# Patient Record
Sex: FEMALE | Race: WHITE | Employment: FULL TIME | URBAN - METROPOLITAN AREA
[De-identification: names, ages, dates, MRNs, and addresses within clinical notes are randomized per-mention and may not be internally consistent; named-entity substitution may affect disease eponyms.]

---
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
## 2020-07-17 ENCOUNTER — TELEPHONE (OUTPATIENT)
Dept: FAMILY MEDICINE CLINIC | Age: 20
End: 2020-07-17

## 2020-07-17 ENCOUNTER — NURSE TRIAGE (OUTPATIENT)
Dept: OTHER | Facility: CLINIC | Age: 20
End: 2020-07-17

## 2020-07-17 ENCOUNTER — OFFICE VISIT (OUTPATIENT)
Dept: FAMILY MEDICINE CLINIC | Age: 20
End: 2020-07-17
Payer: COMMERCIAL

## 2020-07-17 VITALS
OXYGEN SATURATION: 97 % | HEIGHT: 66 IN | DIASTOLIC BLOOD PRESSURE: 68 MMHG | TEMPERATURE: 99.3 F | WEIGHT: 170.4 LBS | SYSTOLIC BLOOD PRESSURE: 134 MMHG | HEART RATE: 96 BPM | BODY MASS INDEX: 27.38 KG/M2

## 2020-07-17 PROBLEM — M25.562 ARTHRALGIA OF BOTH KNEES: Status: ACTIVE | Noted: 2020-07-17

## 2020-07-17 PROBLEM — M25.571 ARTHRALGIA OF BOTH ANKLES: Status: ACTIVE | Noted: 2020-07-17

## 2020-07-17 PROBLEM — M25.572 ARTHRALGIA OF BOTH ANKLES: Status: ACTIVE | Noted: 2020-07-17

## 2020-07-17 PROBLEM — Z76.89 ENCOUNTER TO ESTABLISH CARE: Status: ACTIVE | Noted: 2020-07-17

## 2020-07-17 PROBLEM — Z00.01 ENCOUNTER FOR WELL ADULT EXAM WITH ABNORMAL FINDINGS: Status: ACTIVE | Noted: 2020-07-17

## 2020-07-17 PROBLEM — M25.561 ARTHRALGIA OF BOTH KNEES: Status: ACTIVE | Noted: 2020-07-17

## 2020-07-17 LAB
A/G RATIO: 1.7 (ref 1.1–2.2)
ALBUMIN SERPL-MCNC: 4.6 G/DL (ref 3.4–5)
ALP BLD-CCNC: 66 U/L (ref 40–129)
ALT SERPL-CCNC: 12 U/L (ref 10–40)
ANION GAP SERPL CALCULATED.3IONS-SCNC: 15 MMOL/L (ref 3–16)
AST SERPL-CCNC: 18 U/L (ref 15–37)
BASOPHILS ABSOLUTE: 0.1 K/UL (ref 0–0.2)
BASOPHILS RELATIVE PERCENT: 0.5 %
BILIRUB SERPL-MCNC: <0.2 MG/DL (ref 0–1)
BUN BLDV-MCNC: 17 MG/DL (ref 7–20)
C-REACTIVE PROTEIN: <0.3 MG/L (ref 0–5.1)
CALCIUM SERPL-MCNC: 9.7 MG/DL (ref 8.3–10.6)
CHLORIDE BLD-SCNC: 102 MMOL/L (ref 99–110)
CHOLESTEROL, FASTING: 149 MG/DL (ref 0–199)
CO2: 21 MMOL/L (ref 21–32)
CREAT SERPL-MCNC: 0.7 MG/DL (ref 0.6–1.1)
EOSINOPHILS ABSOLUTE: 0.1 K/UL (ref 0–0.6)
EOSINOPHILS RELATIVE PERCENT: 0.9 %
GFR AFRICAN AMERICAN: >60
GFR NON-AFRICAN AMERICAN: >60
GLOBULIN: 2.7 G/DL
GLUCOSE FASTING: 96 MG/DL (ref 70–99)
HCT VFR BLD CALC: 41.7 % (ref 36–48)
HDLC SERPL-MCNC: 58 MG/DL (ref 40–60)
HEMOGLOBIN: 13.5 G/DL (ref 12–16)
LDL CHOLESTEROL CALCULATED: 80 MG/DL
LYMPHOCYTES ABSOLUTE: 2.8 K/UL (ref 1–5.1)
LYMPHOCYTES RELATIVE PERCENT: 21.7 %
MCH RBC QN AUTO: 27.7 PG (ref 26–34)
MCHC RBC AUTO-ENTMCNC: 32.5 G/DL (ref 31–36)
MCV RBC AUTO: 85.4 FL (ref 80–100)
MONOCYTES ABSOLUTE: 0.8 K/UL (ref 0–1.3)
MONOCYTES RELATIVE PERCENT: 6.6 %
NEUTROPHILS ABSOLUTE: 9 K/UL (ref 1.7–7.7)
NEUTROPHILS RELATIVE PERCENT: 70.3 %
PDW BLD-RTO: 13.6 % (ref 12.4–15.4)
PLATELET # BLD: 204 K/UL (ref 135–450)
PMV BLD AUTO: 10.4 FL (ref 5–10.5)
POTASSIUM SERPL-SCNC: 4 MMOL/L (ref 3.5–5.1)
RBC # BLD: 4.89 M/UL (ref 4–5.2)
SEDIMENTATION RATE, ERYTHROCYTE: 6 MM/HR (ref 0–20)
SODIUM BLD-SCNC: 138 MMOL/L (ref 136–145)
T4 FREE: 1.7 NG/DL (ref 0.9–1.8)
TOTAL PROTEIN: 7.3 G/DL (ref 6.4–8.2)
TRIGLYCERIDE, FASTING: 55 MG/DL (ref 0–150)
TSH SERPL DL<=0.05 MIU/L-ACNC: 4.07 UIU/ML (ref 0.27–4.2)
VLDLC SERPL CALC-MCNC: 11 MG/DL
WBC # BLD: 12.8 K/UL (ref 4–11)

## 2020-07-17 PROCEDURE — 36415 COLL VENOUS BLD VENIPUNCTURE: CPT | Performed by: NURSE PRACTITIONER

## 2020-07-17 PROCEDURE — 99385 PREV VISIT NEW AGE 18-39: CPT | Performed by: NURSE PRACTITIONER

## 2020-07-17 RX ORDER — METHYLPREDNISOLONE 4 MG/1
TABLET ORAL
Qty: 1 KIT | Refills: 0 | Status: SHIPPED | OUTPATIENT
Start: 2020-07-17 | End: 2020-07-23

## 2020-07-17 RX ORDER — NAPROXEN 375 MG/1
375 TABLET ORAL 2 TIMES DAILY WITH MEALS
Qty: 20 TABLET | Refills: 1 | Status: SHIPPED | OUTPATIENT
Start: 2020-07-17 | End: 2020-11-19

## 2020-07-17 SDOH — HEALTH STABILITY: MENTAL HEALTH
STRESS IS WHEN SOMEONE FEELS TENSE, NERVOUS, ANXIOUS, OR CAN'T SLEEP AT NIGHT BECAUSE THEIR MIND IS TROUBLED. HOW STRESSED ARE YOU?: VERY MUCH

## 2020-07-17 SDOH — HEALTH STABILITY: MENTAL HEALTH: HOW OFTEN DO YOU HAVE A DRINK CONTAINING ALCOHOL?: NEVER

## 2020-07-17 SDOH — HEALTH STABILITY: PHYSICAL HEALTH: ON AVERAGE, HOW MANY DAYS PER WEEK DO YOU ENGAGE IN MODERATE TO STRENUOUS EXERCISE (LIKE A BRISK WALK)?: 2 DAYS

## 2020-07-17 ASSESSMENT — ENCOUNTER SYMPTOMS
CHEST TIGHTNESS: 0
VOMITING: 0
EYE REDNESS: 0
WHEEZING: 0
SINUS PAIN: 0
SORE THROAT: 0
COUGH: 0
SHORTNESS OF BREATH: 0
EYE PAIN: 0
FACIAL SWELLING: 0
ABDOMINAL PAIN: 0
GASTROINTESTINAL NEGATIVE: 1

## 2020-07-17 ASSESSMENT — PATIENT HEALTH QUESTIONNAIRE - PHQ9
2. FEELING DOWN, DEPRESSED OR HOPELESS: 0
SUM OF ALL RESPONSES TO PHQ QUESTIONS 1-9: 0
1. LITTLE INTEREST OR PLEASURE IN DOING THINGS: 0
SUM OF ALL RESPONSES TO PHQ9 QUESTIONS 1 & 2: 0
SUM OF ALL RESPONSES TO PHQ QUESTIONS 1-9: 0

## 2020-07-17 NOTE — TELEPHONE ENCOUNTER
----- Message from Anapippa Dawn sent at 7/17/2020 12:25 PM EDT -----  Subject: Appointment Request    QUESTIONS  Reason for appointment request? Other - Patient was a patient of Dr. Marvin Faulkner   called in for swelling and redness on leg   nurse triage would like for her to be seen today. Please advise  ---------------------------------------------------------------------------  --------------  CALL BACK INFO  What is the best way for the office to contact you? OK to leave message on   voicemail  Preferred Call Back Phone Number? [phone]  ---------------------------------------------------------------------------  --------------  SCRIPT ANSWERS  Patient needs to be seen today? Yes  Nurse Name? Raghav Yancey  (Did RN indicate the need for Red Scheduling?)?  Yes

## 2020-07-17 NOTE — PATIENT INSTRUCTIONS
Patient Education        Joint Pain: Care Instructions  Your Care Instructions     Many people have small aches and pains from overuse or injury to muscles and joints. Joint injuries often happen during sports or recreation, work tasks, or projects around the home. An overuse injury can happen when you put too much stress on a joint or when you do an activity that stresses the joint over and over, such as using the computer or rowing a boat. You can take action at home to help your muscles and joints get better. You should feel better in 1 to 2 weeks, but it can take 3 months or more to heal completely. Follow-up care is a key part of your treatment and safety. Be sure to make and go to all appointments, and call your doctor if you are having problems. It's also a good idea to know your test results and keep a list of the medicines you take. How can you care for yourself at home? · Do not put weight on the injured joint for at least a day or two. · For the first day or two after an injury, do not take hot showers or baths, and do not use hot packs. The heat could make swelling worse. · Put ice or a cold pack on the sore joint for 10 to 20 minutes at a time. Try to do this every 1 to 2 hours for the next 3 days (when you are awake) or until the swelling goes down. Put a thin cloth between the ice and your skin. · Wrap the injury in an elastic bandage. Do not wrap it too tightly because this can cause more swelling. · Prop up the sore joint on a pillow when you ice it or anytime you sit or lie down during the next 3 days. Try to keep it above the level of your heart. This will help reduce swelling. · Take an over-the-counter pain medicine, such as acetaminophen (Tylenol), ibuprofen (Advil, Motrin), or naproxen (Aleve). Read and follow all instructions on the label. · After 1 or 2 days of rest, begin moving the joint gently.  While the joint is still healing, you can begin to exercise using activities that do not strain or hurt the painful joint. When should you call for help? Call your doctor now or seek immediate medical care if:  · You have signs of infection, such as:  ? Increased pain, swelling, warmth, and redness. ? Red streaks leading from the joint. ? A fever. Watch closely for changes in your health, and be sure to contact your doctor if:  · Your movement or symptoms are not getting better after 1 to 2 weeks of home treatment. Where can you learn more? Go to https://TrunqShow.[employer]. org and sign in to your [employer] account. Enter P205 in the [employer] box to learn more about \"Joint Pain: Care Instructions. \"     If you do not have an account, please click on the \"Sign Up Now\" link. Current as of: March 2, 2020               Content Version: 12.5  © 5623-3941 [employer]. Care instructions adapted under license by [facility] ([facility]). If you have questions about a medical condition or this instruction, always ask your healthcare professional. [place] [address] any warranty or liability for your use of this information. Patient Education        Oral Corticosteroids: Care Instructions  Your Care Instructions     Oral corticosteroids are commonly used medicines. They help calm down the body's response to inflammation. Oral means that they are taken by mouth. This is most often in the form of a pill. They are used for treating many conditions. You may take them for asthma, COPD, back pain, or allergic reactions. They are also used for other conditions such as autoimmune diseases and certain types of cancer. You may have side effects from taking this medicine. These include nausea, headache, dizziness, and anxiety. Pregnant women should not take this medicine unless their doctor tells them to. Follow your doctor's instructions on how to take this medicine.  If you are taking it for 2 weeks or more, your doctor may give you special instructions to slowly reduce (taper) the amount you take. Slowly cutting down on the medicine over time helps your body adjust to the change. Follow-up care is a key part of your treatment and safety. Be sure to make and go to all appointments, and call your doctor if you are having problems. It's also a good idea to know your test results and keep a list of the medicines you take. How can you care for yourself at home? · Be safe with medicines. Take your medicines exactly as prescribed. Call your doctor if you think you are having a problem with your medicine. You will get more details on the specific medicines your doctor prescribes. · Take your medicine after a meal. It may cause nausea if you take it on an empty stomach. · Avoid taking nonsteroidal anti-inflammatory drugs (NSAIDs) while you are taking oral corticosteroids. Taking both of these medicines might cause an upset stomach. NSAIDs include ibuprofen (Advil, Motrin) and naproxen (Aleve). · If you have a history of stomach ulcers, you may want to avoid taking this medicine and an NSAID at the same time. This can cause stomach upset or bleeding. · Follow your doctor's instructions for how to stop taking this medicine. You may need to taper it. This means the medicine should be slowly reduced. Do not stop taking the medicine all at once. When should you call for help? [id number] if:  · You vomit blood or what looks like coffee grounds. Call your doctor now or seek immediate medical care if:  · Your symptoms are getting worse. · You are dizzy or lightheaded, or you feel like you may faint. · You have new or worse nausea or vomiting. · You have stomach pain that is getting worse. · Your stools are black. Watch closely for changes in your health, and be sure to contact your doctor if:  · You do not get better as expected. Where can you learn more? Go to https://chpechongeb.[employer]. org and sign in to your [employer] account.  Enter I485 in the [address] Information box to learn more about \"Oral Corticosteroids: Care Instructions. \"     If you do not have an account, please click on the \"Sign Up Now\" link. Current as of: February 24, 2020               Content Version: 12.5  © 5684-7103 Healthwise, Incorporated. Care instructions adapted under license by [facility] ([facility]). If you have questions about a medical condition or this instruction, always ask your healthcare professional. [address] any warranty or liability for your use of this information.

## 2020-07-17 NOTE — TELEPHONE ENCOUNTER
If this is the correct person. He has never been seen in the office.   It appears he contacted the office in August 2017 to see Alessandro Marie but was never seen  Lia Chopra may have a new patient appointment open today but you can check with her

## 2020-07-17 NOTE — PROGRESS NOTES
2020    Rosa Maria Power (:  2000) is a 21 y.o. female, here for evaluation of the following medical concerns: physical and to discuss joint pain. Chief Complaint   Patient presents with    New Patient    Swelling     knees redness on B. leg    Rash     r arm, around  jaw,     Other     works at [employer]     Past medical, surgical, family and social history, as well as current medications and allergies, were reviewed and updated with the patient. Joint Symptoms:  Patient complains of a four week history of pain, swelling, stiffness, morning stiffness lasting 60 minutes/day, decreased mobility, erythema and warmth in bilateral ankle,  Bilateral hips, bilateral knee and bilateral, lower leg. Pain is persistent, aching in nature, and is moderate in intensity. Radiation: none. Associated symptoms:  fatigue and rash- to bilateral lower cheeks and neck. She denies weakness, paresthesia, fevers, night sweats, unintentional weight loss, fatigue, headache, mucocutaneous ulcers, Raynaud's phenomenon, abdominal pain, dysphagia, change in bowel or bladder habits, nausea, vomiting, visual change, chest pain, shortness of breath, depressed mood, anxiety and insomnia. Symptoms are exacerbated by flexion, extension, standing, lifting and changing positions. Precipitating factors: no known injury. Symptoms are worse first thing in the morning. Prior history of similar symptoms: none. Previous treatment: none. Symptoms show no change over time. Patient states her twin sister has been diagnosed with Rheumatoid Arthritis and she is concerned this is what is happening to her. Rash   This is a new problem. The current episode started 1 to 4 weeks ago. The problem has been gradually improving since onset. The affected locations include the face and right wrist. The rash is characterized by redness. She was exposed to nothing.  Pertinent negatives include no cough, eye pain, facial edema, fatigue, fever, shortness of breath, sore throat or vomiting. Past treatments include nothing. The treatment provided no relief. Patient denies any recent illness, fever, infections    Review of Systems   Constitutional: Negative for appetite change, chills, diaphoresis, fatigue and fever. HENT: Negative for ear pain, facial swelling, nosebleeds, sinus pain and sore throat. Eyes: Negative for pain and redness. Respiratory: Negative for cough, chest tightness, shortness of breath and wheezing. Cardiovascular: Negative for chest pain, palpitations and leg swelling. Gastrointestinal: Negative. Negative for abdominal pain and vomiting. Genitourinary: Negative. Musculoskeletal: Positive for arthralgias and joint swelling. Skin: Positive for rash. Neurological: Negative for dizziness, weakness, light-headedness and numbness. Psychiatric/Behavioral: Negative. Prior to Visit Medications    Medication Sig Taking? Authorizing Provider   methylPREDNISolone (MEDROL DOSEPACK) 4 MG tablet Take by mouth. Yes Edwina Rodríguez APRN - CNP   naproxen (NAPROSYN) 375 MG tablet Take 1 tablet by mouth 2 times daily (with meals) for 10 days Yes MONTANA Barroso CNP        No Known Allergies    History reviewed. No pertinent past medical history. History reviewed. No pertinent surgical history. Social History     Tobacco Use    Smoking status: Never Smoker    Smokeless tobacco: Never Used   Substance Use Topics    Alcohol use: Never     Frequency: Never    Drug use: Never        Family History   Problem Relation Age of Onset    Arthritis Sister        Vitals:    07/17/20 1427   BP: 134/68   Pulse: 96   Temp: 99.3 °F (37.4 °C)   SpO2: 97%   Weight: 170 lb 6.4 oz (77.3 kg)   Height: 5' 5.5\" (1.664 m)     Estimated body mass index is 27.92 kg/m² as calculated from the following:    Height as of this encounter: 5' 5.5\" (1.664 m). Weight as of this encounter: 170 lb 6.4 oz (77.3 kg).     Physical Exam  Vitals signs and nursing note reviewed. Constitutional:       General: She is awake. She is not in acute distress. Appearance: Normal appearance. She is well-developed, well-groomed and normal weight. She is not ill-appearing, toxic-appearing or diaphoretic. HENT:      Head: Normocephalic. Jaw: There is normal jaw occlusion. Right Ear: Tympanic membrane, ear canal and external ear normal.      Left Ear: Tympanic membrane, ear canal and external ear normal.      Nose: Nose normal. No congestion or rhinorrhea. Mouth/Throat:      Mouth: Mucous membranes are moist.      Pharynx: Oropharynx is clear. Eyes:      Conjunctiva/sclera: Conjunctivae normal.      Pupils: Pupils are equal, round, and reactive to light. Neck:      Musculoskeletal: Full passive range of motion without pain. Thyroid: No thyroid mass, thyromegaly or thyroid tenderness. Cardiovascular:      Rate and Rhythm: Normal rate and regular rhythm. Pulses: Normal pulses. Heart sounds: Normal heart sounds, S1 normal and S2 normal. No murmur. Pulmonary:      Effort: Pulmonary effort is normal.      Breath sounds: Normal breath sounds and air entry. Abdominal:      General: Abdomen is flat. Bowel sounds are normal.      Palpations: Abdomen is soft. Musculoskeletal:         General: No deformity or signs of injury. Right knee: She exhibits swelling. Left knee: She exhibits swelling and erythema. Right ankle: She exhibits swelling. Left ankle: She exhibits swelling. Right lower leg: No edema. Left lower leg: No edema. Skin:     General: Skin is warm and dry. Capillary Refill: Capillary refill takes less than 2 seconds. Findings: Rash present. Comments: Rash is noted on bilateral lower cheeks and neck and right wrist.  Does not itch. Neurological:      General: No focal deficit present. Mental Status: She is alert and oriented to person, place, and time.

## 2020-07-17 NOTE — TELEPHONE ENCOUNTER
Reason for Disposition   [1] Very swollen joint AND [2] no fever    Answer Assessment - Initial Assessment Questions  1. LOCATION: \"Where is the swelling located? \"  (e.g., left, right, both knees)      Bilateral knee swelling  2. SIZE and DESCRIPTION: \"What does the swelling look like? \"  (e.g., entire knee, localized)      Right above knee cap into knee cap, and down into ankles. Looks red and only swollen in the knee area  3. ONSET: \"When did the swelling start? \" \"Does it come and go, or is it there all the time? \"      7/3/2020  4. PAIN: \"Is there any pain? \" If so, ask: \"How bad is it? \" (Scale 1-10; or mild, moderate, severe)      8-9/10 on pain scale  5. SETTING: \"Has there been any recent work, exercise or other activity that involved that part of the body? \"       no  6. AGGRAVATING FACTORS: \"What makes the knee swelling worse? \" (e.g., walking, climbing stairs, running)      Activity makes it worse  7. ASSOCIATED SYMPTOMS: \"Is there any pain or redness? \"      Pain and redness yes  8. OTHER SYMPTOMS: \"Do you have any other symptoms? \" (e.g., chest pain, difficulty breathing, fever, calf pain)      no  9. PREGNANCY: \"Is there any chance you are pregnant? \" \"When was your last menstrual period? \"      6/26/2020    Protocols used: KNEE SWELLING-ADULT-AH    Caller states she started having bilateral knee pain, redness and swelling on 7/3/2020. The redness goes to her ankles but they are not swollen. Pain is 9/10. Recommendation See PCP within 24 hours. Let caller know if she can not be seen she should go to clinic or [facility] to be seen. Caller verbalized understanding. Transferred to [facility] for an appointment.

## 2020-07-18 LAB
ANTI-NUCLEAR ANTIBODY (ANA): NEGATIVE
ESTIMATED AVERAGE GLUCOSE: 91.1 MG/DL
HBA1C MFR BLD: 4.8 %

## 2020-07-23 ENCOUNTER — TELEPHONE (OUTPATIENT)
Dept: FAMILY MEDICINE CLINIC | Age: 20
End: 2020-07-23

## 2020-07-23 NOTE — TELEPHONE ENCOUNTER
Patient had labs drawn last week  Requesting results  If patient can not be reached, contact her mother Delores,phone listed in contacts

## 2020-08-03 ENCOUNTER — TELEPHONE (OUTPATIENT)
Dept: FAMILY MEDICINE CLINIC | Age: 20
End: 2020-08-03

## 2020-08-03 NOTE — TELEPHONE ENCOUNTER
Patient has not received a call regarding these test results. She can be reached at [phone]. Her mother, Ketan Alfaro, is on her HIPPA and patient states she can be given this information.

## 2020-08-03 NOTE — TELEPHONE ENCOUNTER
Patient has a co-worker whose  tested positive for COVID last Monday. The co-worker tested negative last Tuesday and returned to work on Friday. She collapsed at work on Friday and Brandy Redman used the co-workers phone to call an ambulance and handled a bottle of water that the co-worker had drank out of. The co-worker tested positive on Saturday. Brandy Redman didn't know if there is a waiting period after being exposed so she has not been tested. I have scheduled her at [facility] with the red clinic.

## 2020-08-04 ENCOUNTER — OFFICE VISIT (OUTPATIENT)
Dept: PRIMARY CARE CLINIC | Age: 20
End: 2020-08-04
Payer: COMMERCIAL

## 2020-08-04 PROCEDURE — 99211 OFF/OP EST MAY X REQ PHY/QHP: CPT | Performed by: NURSE PRACTITIONER

## 2020-08-04 NOTE — PATIENT INSTRUCTIONS

## 2020-08-04 NOTE — PROGRESS NOTES
Cassius Cardoza received a viral test for COVID-19. They were educated on isolation and quarantine as appropriate. For any symptoms, they were directed to seek care from their PCP, given contact information to establish with a doctor, directed to an urgent care or the emergency room.

## 2020-08-06 ENCOUNTER — TELEPHONE (OUTPATIENT)
Dept: FAMILY MEDICINE CLINIC | Age: 20
End: 2020-08-06

## 2020-08-06 LAB — SARS-COV-2, NAA: NOT DETECTED

## 2020-08-06 NOTE — TELEPHONE ENCOUNTER
Patient was exposed to [employer] last week and her test came back negative. Can she return to work now or does she have to still quarantine?

## 2020-08-16 PROBLEM — Z00.01 ENCOUNTER FOR WELL ADULT EXAM WITH ABNORMAL FINDINGS: Status: RESOLVED | Noted: 2020-07-17 | Resolved: 2020-08-16

## 2020-09-09 ENCOUNTER — OFFICE VISIT (OUTPATIENT)
Dept: FAMILY MEDICINE CLINIC | Age: 20
End: 2020-09-09
Payer: COMMERCIAL

## 2020-09-09 VITALS
WEIGHT: 171.2 LBS | OXYGEN SATURATION: 98 % | HEIGHT: 66 IN | DIASTOLIC BLOOD PRESSURE: 60 MMHG | TEMPERATURE: 97 F | HEART RATE: 113 BPM | BODY MASS INDEX: 27.51 KG/M2 | SYSTOLIC BLOOD PRESSURE: 122 MMHG

## 2020-09-09 PROCEDURE — 99213 OFFICE O/P EST LOW 20 MIN: CPT | Performed by: NURSE PRACTITIONER

## 2020-09-09 RX ORDER — TRIAMCINOLONE ACETONIDE 1 MG/G
CREAM TOPICAL
Qty: 1 TUBE | Refills: 2 | Status: SHIPPED | OUTPATIENT
Start: 2020-09-09 | End: 2020-11-19

## 2020-09-09 ASSESSMENT — ENCOUNTER SYMPTOMS
SORE THROAT: 0
SHORTNESS OF BREATH: 0
COUGH: 0
DIARRHEA: 0
WHEEZING: 0
CHEST TIGHTNESS: 0

## 2020-09-09 NOTE — PATIENT INSTRUCTIONS
Patient Education        Dermatitis: Care Instructions  Your Care Instructions  Dermatitis is the general name used for any rash or inflammation of the skin. Different kinds of dermatitis cause different kinds of rashes. Common causes of a rash include new medicines, plants (such as poison oak or poison ivy), heat, and stress. Certain illnesses can also cause a rash. An allergic reaction to something that touches your skin, such as latex, nickel, or poison ivy, is called contact dermatitis. Contact dermatitis may also be caused by something that irritates the skin, such as bleach, a chemical, or soap. These types of rashes cannot be spread from person to person. How long your rash will last depends on what caused it. Rashes may last a few days or months. Follow-up care is a key part of your treatment and safety. Be sure to make and go to all appointments, and call your doctor if you are having problems. It's also a good idea to know your test results and keep a list of the medicines you take. How can you care for yourself at home? · Do not scratch the rash. Cut your nails short, and file them smooth. Or wear gloves if this helps keep you from scratching. · Wash the area with water only. Pat dry. · Put cold, wet cloths on the rash to reduce itching. · Keep cool, and stay out of the sun. · Leave the rash open to the air as much as possible. · If the rash itches, use hydrocortisone cream. Follow the directions on the label. Calamine lotion may help for plant rashes. · Take an over-the-counter antihistamine, such as diphenhydramine (Benadryl) or loratadine (Claritin), to help calm the itching. Read and follow all instructions on the label. · If your doctor prescribed a cream, use it as directed. If your doctor prescribed medicine, take it exactly as directed. When should you call for help?    Call your doctor now or seek immediate medical care if:  · You have symptoms of infection, such as:  ? Increased pain, swelling, warmth, or redness. ? Red streaks leading from the area. ? Pus draining from the area. ? A fever. · You have joint pain along with the rash. Watch closely for changes in your health, and be sure to contact your doctor if:  · Your rash is changing or getting worse. · You are not getting better as expected. Where can you learn more? Go to https://Mode De Faire.[employer]. org and sign in to your [employer] account. Enter [phone] in the [facility] box to learn more about \"Dermatitis: Care Instructions. \"     If you do not have an account, please click on the \"Sign Up Now\" link. Current as of: October 31, 2019               Content Version: 12.5  © 9673-4898 Healthwise, Incorporated. Care instructions adapted under license by [facility] ([facility]). If you have questions about a medical condition or this instruction, always ask your healthcare professional. [place] [address] any warranty or liability for your use of this information.

## 2020-09-09 NOTE — PROGRESS NOTES
2020     Alexsandra Bailey (:  2000) is a 21 y.o. female, here for evaluation of the following medical concerns:    Chief Complaint   Patient presents with    Rash     every time on period get worst.        Rash   This is a chronic problem. The current episode started more than 1 year ago. The problem is unchanged. Location: right forearm/left forearm. The rash is characterized by blistering, burning and itchiness. She was exposed to nothing. Pertinent negatives include no congestion, cough, diarrhea, facial edema, fever, shortness of breath or sore throat. (Monthly menses) Past treatments include cold compress and moisturizer. The treatment provided no relief. There is no history of allergies, asthma, eczema or varicella. Review of Systems   Constitutional: Negative. Negative for fever. HENT: Negative. Negative for congestion and sore throat. Respiratory: Negative for cough, chest tightness, shortness of breath and wheezing. Cardiovascular: Negative for chest pain and palpitations. Gastrointestinal: Negative for diarrhea. Genitourinary: Negative. Musculoskeletal: Negative. Skin: Positive for rash. Neurological: Negative for dizziness, tremors, syncope, weakness, light-headedness, numbness and headaches. Psychiatric/Behavioral: Negative. Prior to Visit Medications    Medication Sig Taking? Authorizing Provider   triamcinolone (KENALOG) 0.1 % cream Apply topically 2 times daily.  Yes MONTANA Delgado CNP   naproxen (NAPROSYN) 375 MG tablet Take 1 tablet by mouth 2 times daily (with meals) for 10 days  MONTANA Delgado CNP        Social History     Tobacco Use    Smoking status: Never Smoker    Smokeless tobacco: Never Used   Substance Use Topics    Alcohol use: Never     Frequency: Never    Drug use: Never        Vitals:    20 0819   BP: 122/60   Pulse: 113   Temp: 97 °F (36.1 °C)   SpO2: 98%   Weight: 171 lb 3.2 oz (77.7 kg)   Height: 5' 5.5\" (1.664 m) Estimated body mass index is 28.06 kg/m² as calculated from the following:    Height as of this encounter: 5' 5.5\" (1.664 m). Weight as of this encounter: 171 lb 3.2 oz (77.7 kg). Physical Exam  Vitals signs and nursing note reviewed. Constitutional:       General: She is awake. Appearance: Normal appearance. She is well-developed, well-groomed and normal weight. Cardiovascular:      Rate and Rhythm: Normal rate and regular rhythm. Pulses: Normal pulses. Heart sounds: Normal heart sounds, S1 normal and S2 normal. No murmur. Pulmonary:      Effort: Pulmonary effort is normal.      Breath sounds: Normal breath sounds and air entry. Musculoskeletal:      Right lower leg: No edema. Left lower leg: No edema. Skin:     General: Skin is warm. Capillary Refill: Capillary refill takes less than 2 seconds. Findings: Rash present. Rash is papular. Comments: Rash located bilaterally on forearms. Neurological:      General: No focal deficit present. Mental Status: She is alert and oriented to person, place, and time. Psychiatric:         Mood and Affect: Mood normal.         Behavior: Behavior is cooperative. ASSESSMENT/PLAN:  1. Dermatitis  Rashes noted to bilateral arms at same time as menses monthly. Start - triamcinolone (KENALOG) 0.1 % cream; Apply topically 2 times daily. Dispense: 1 Tube; Refill: 2  Referral - Narcisa Daniel MD, Gynecology, [facility]    Return in about 2 weeks (around 9/23/2020).

## 2020-11-16 ENCOUNTER — OFFICE VISIT (OUTPATIENT)
Dept: PRIMARY CARE CLINIC | Age: 20
End: 2020-11-16
Payer: COMMERCIAL

## 2020-11-16 PROCEDURE — 99211 OFF/OP EST MAY X REQ PHY/QHP: CPT | Performed by: NURSE PRACTITIONER

## 2020-11-16 NOTE — PROGRESS NOTES
Daniella Marquis received a viral test for COVID-19. They were educated on isolation and quarantine as appropriate. For any symptoms, they were directed to seek care from their PCP, given contact information to establish with a doctor, directed to an urgent care or the emergency room.

## 2020-11-18 LAB — SARS-COV-2, NAA: DETECTED

## 2020-11-19 ENCOUNTER — VIRTUAL VISIT (OUTPATIENT)
Dept: FAMILY MEDICINE CLINIC | Age: 20
End: 2020-11-19
Payer: COMMERCIAL

## 2020-11-19 ENCOUNTER — TELEPHONE (OUTPATIENT)
Dept: FAMILY MEDICINE CLINIC | Age: 20
End: 2020-11-19

## 2020-11-19 PROCEDURE — 99213 OFFICE O/P EST LOW 20 MIN: CPT | Performed by: NURSE PRACTITIONER

## 2020-11-19 ASSESSMENT — ENCOUNTER SYMPTOMS
WHEEZING: 0
COUGH: 0
VOMITING: 0
DIARRHEA: 0
FACIAL SWELLING: 0
SORE THROAT: 1
SINUS PRESSURE: 0
SINUS PAIN: 0
EYE DISCHARGE: 0
EYE ITCHING: 0
NAUSEA: 0
BACK PAIN: 1
CHEST TIGHTNESS: 0
SHORTNESS OF BREATH: 0

## 2020-11-19 NOTE — PROGRESS NOTES
2020    TELEHEALTH EVALUATION -- Audio/Visual (During XEHEI-24 public health emergency)    Daniella Marquis (:  2000) has requested an audio/video evaluation for the following concern(s):    HPI  Patient states she has tested positive for covid. She wants to know if there is anything else she will need to do and how long she should quarantine. Symptoms include: Headaches, severe back pain, lost taste and smell, nasal congestion, sore throat, fatigue, fevers at night. She has used chloroseptic and tylenol as needed. Review of Systems   HENT: Positive for congestion and sore throat. Negative for ear discharge, ear pain, facial swelling, sinus pressure and sinus pain. Eyes: Negative for discharge and itching. Respiratory: Negative for cough, chest tightness, shortness of breath and wheezing. Cardiovascular: Negative for chest pain and palpitations. Gastrointestinal: Negative for diarrhea, nausea and vomiting. Genitourinary: Negative for difficulty urinating. Musculoskeletal: Positive for back pain. Neurological: Positive for headaches. Negative for dizziness and weakness. Psychiatric/Behavioral: Negative. Prior to Visit Medications    Not on File     No past medical history on file. No past surgical history on file. Family History   Problem Relation Age of Onset    Arthritis Sister      No Known Allergies    Social History     Tobacco Use    Smoking status: Never Smoker    Smokeless tobacco: Never Used   Substance Use Topics    Alcohol use: Never     Frequency: Never    Drug use: Never      PHYSICAL EXAMINATION:  Vital Signs: (As obtained by patient/caregiver or practitioner observation)  There were no vitals taken for this visit. Respiratory rate appears normal at a rate of 16/minute. Constitutional: Appears well-developed and well-nourished. No apparent distress    Mental status: Alert and awake. Oriented to person/place/yuni.  Able to follow commands    Eyes: EOM normal. Sclera normal. No discharge visible  HENT: Normocephalic, atraumatic. Mouth/Throat: Mucous membranes are moist. External Ears Normal    Neck: No visualized mass   Pulmonary/Chest: Respiratory effort normal.  No visualized signs of difficulty breathing or respiratory distress        Musculoskeletal:  Normal range of motion of neck  Neurological:       No Facial Asymmetry (Cranial nerve 7 motor function) (limited exam to video visit). No gaze palsy       Skin:  No significant exanthematous lesions or discoloration noted on facial skin       Psychiatric: Normal Affect. No Hallucinations            ASSESSMENT/PLAN:  1. COVID-19  Quarantine for 14 days from symptoms onset. Tylenol for pain/fever  Increase fluids  Rest  If shortness of breath becomes severe, go to ER immediately    Return if symptoms worsen or fail to improve. Evangelina Vigil is a 21 y.o. female being evaluated by a Virtual Visit (video visit) encounter to address concerns as mentioned above. A caregiver was present when appropriate. Due to this being a TeleHealth encounter (During DNVYC-13 public health emergency), evaluation of the following organ systems was limited: Vitals/Constitutional/EENT/Resp/CV/GI//MS/Neuro/Skin/Heme-Lymph-Imm. Pursuant to the emergency declaration under the [address] [place] [address] authority and the [employer] Act, this Virtual Visit was conducted with patient's (and/or legal guardian's) consent, to reduce the patient's risk of exposure to COVID-19 and provide necessary medical care. The patient (and/or legal guardian) has also been advised to contact this office for worsening conditions or problems, and seek emergency medical treatment and/or call 911 if deemed necessary.      Patient identification was verified at the start of the visit: Yes    Total time spent on this encounter: 15 minutes    Services were provided through a video synchronous discussion virtually to substitute for in-person clinic visit. Patient and provider were located at their individual homes. --MONTANA Noyola CNP on 11/19/2020 at 4:26 PM    An electronic signature was used to authenticate this note. Martin Mohr

## 2021-02-15 ENCOUNTER — OFFICE VISIT (OUTPATIENT)
Dept: FAMILY MEDICINE CLINIC | Age: 21
End: 2021-02-15
Payer: COMMERCIAL

## 2021-02-15 ENCOUNTER — NURSE TRIAGE (OUTPATIENT)
Dept: OTHER | Facility: CLINIC | Age: 21
End: 2021-02-15

## 2021-02-15 VITALS
BODY MASS INDEX: 29.92 KG/M2 | SYSTOLIC BLOOD PRESSURE: 126 MMHG | WEIGHT: 182.6 LBS | HEART RATE: 103 BPM | OXYGEN SATURATION: 98 % | DIASTOLIC BLOOD PRESSURE: 84 MMHG

## 2021-02-15 DIAGNOSIS — R10.13 DYSPEPSIA: ICD-10-CM

## 2021-02-15 DIAGNOSIS — R00.0 RAPID HEART RATE: ICD-10-CM

## 2021-02-15 DIAGNOSIS — F41.9 ANXIETY: ICD-10-CM

## 2021-02-15 DIAGNOSIS — R07.9 CHEST PAIN, UNSPECIFIED TYPE: Primary | ICD-10-CM

## 2021-02-15 DIAGNOSIS — K08.89 DENTALGIA: ICD-10-CM

## 2021-02-15 PROCEDURE — G8419 CALC BMI OUT NRM PARAM NOF/U: HCPCS | Performed by: NURSE PRACTITIONER

## 2021-02-15 PROCEDURE — G8484 FLU IMMUNIZE NO ADMIN: HCPCS | Performed by: NURSE PRACTITIONER

## 2021-02-15 PROCEDURE — 1036F TOBACCO NON-USER: CPT | Performed by: NURSE PRACTITIONER

## 2021-02-15 PROCEDURE — G8427 DOCREV CUR MEDS BY ELIG CLIN: HCPCS | Performed by: NURSE PRACTITIONER

## 2021-02-15 PROCEDURE — 93000 ELECTROCARDIOGRAM COMPLETE: CPT | Performed by: NURSE PRACTITIONER

## 2021-02-15 PROCEDURE — 99214 OFFICE O/P EST MOD 30 MIN: CPT | Performed by: NURSE PRACTITIONER

## 2021-02-15 RX ORDER — AMOXICILLIN 500 MG/1
500 CAPSULE ORAL 3 TIMES DAILY
Qty: 21 CAPSULE | Refills: 0 | Status: SHIPPED | OUTPATIENT
Start: 2021-02-15 | End: 2021-02-22

## 2021-02-15 RX ORDER — ETHINYL ESTRADIOL/DROSPIRENONE 0.02-3(28)
TABLET ORAL
COMMUNITY
Start: 2020-12-22 | End: 2022-02-02

## 2021-02-15 RX ORDER — OMEPRAZOLE 40 MG/1
40 CAPSULE, DELAYED RELEASE ORAL
Qty: 30 CAPSULE | Refills: 1 | Status: SHIPPED | OUTPATIENT
Start: 2021-02-15 | End: 2021-06-15

## 2021-02-15 ASSESSMENT — ENCOUNTER SYMPTOMS
BACK PAIN: 0
SHORTNESS OF BREATH: 0
ABDOMINAL PAIN: 1
CONSTIPATION: 0
NAUSEA: 1
DIARRHEA: 0
VOMITING: 0

## 2021-02-15 ASSESSMENT — PATIENT HEALTH QUESTIONNAIRE - PHQ9
1. LITTLE INTEREST OR PLEASURE IN DOING THINGS: 1
SUM OF ALL RESPONSES TO PHQ9 QUESTIONS 1 & 2: 2
SUM OF ALL RESPONSES TO PHQ QUESTIONS 1-9: 2
2. FEELING DOWN, DEPRESSED OR HOPELESS: 1

## 2021-02-15 NOTE — PATIENT INSTRUCTIONS
Patient Education        Indigestion (Dyspepsia or Heartburn): Care Instructions  Your Care Instructions  Sometimes it can be hard to pinpoint the cause of indigestion. (It is also called dyspepsia or heartburn.) Most cases of an upset stomach with bloating, burning, burping, and nausea are minor and go away within several hours. Home treatment and over-the-counter medicine often are able to control symptoms. But if you take medicine to relieve your indigestion without making diet and lifestyle changes, your symptoms are likely to return again and again. If you get indigestion often, it may be a sign of a more serious medical problem. Be sure to follow up with your doctor, who may want to do tests to be sure of the cause of your indigestion. Follow-up care is a key part of your treatment and safety. Be sure to make and go to all appointments, and call your doctor if you are having problems. It's also a good idea to know your test results and keep a list of the medicines you take. How can you care for yourself at home? · Your doctor may recommend over-the-counter medicine. For mild or occasional indigestion, antacids such as Gaviscon, Mylanta, Maalox, or Tums, may help. Be safe with medicines. Be careful when you take over-the-counter antacid medicines. Many of these medicines have aspirin in them. Read the label to make sure that you are not taking more than the recommended dose. Too much aspirin can be harmful. · Your doctor also may recommend over-the-counter acid reducers, such as Pepcid AC (famotidine), Tagamet HB (cimetidine), or Prilosec (omeprazole). Read and follow all instructions on the label. If you use these medicines often, talk with your doctor. · Change your eating habits. ? It's best to eat several small meals instead of two or three large meals. ? After you eat, wait 2 to 3 hours before you lie down. ? Chocolate, mint, and alcohol can make GERD worse. ?  Spicy foods, foods that have a lot of acid (like tomatoes and oranges), and coffee can make GERD symptoms worse in some people. If your symptoms are worse after you eat a certain food, you may want to stop eating that food to see if your symptoms get better. · Do not smoke or chew tobacco. Smoking can make GERD worse. If you need help quitting, talk to your doctor about stop-smoking programs and medicines. These can increase your chances of quitting for good. · If you have GERD symptoms at night, raise the head of your bed 6 to 8 inches. You can do this by putting the frame on blocks or placing a foam wedge under the head of your mattress. (Adding extra pillows does not work.)  · Do not wear tight clothing around your middle. · Lose weight if you need to. Losing just 5 to 10 pounds can help. · Do not take anti-inflammatory medicines, such as aspirin, ibuprofen (Advil, Motrin), or naproxen (Aleve). These can irritate the stomach. If you need a pain medicine, try acetaminophen (Tylenol), which does not cause stomach upset. When should you call for help? Call your doctor now or seek immediate medical care if:    · You have new or worse belly pain.     · You are vomiting. Watch closely for changes in your health, and be sure to contact your doctor if:    · You have new or worse symptoms of indigestion.     · You have trouble or pain swallowing.     · You are losing weight.     · You do not get better as expected. Where can you learn more? Go to [email or web address] and sign in to your [employer] account. Enter K987 in the [facility] box to learn more about \"Indigestion (Dyspepsia or Heartburn): Care Instructions. \"     If you do not have an account, please click on the \"Sign Up Now\" link. Current as of: April 15, 2020               Content Version: 12.6  © 2172-0719 [employer]. Care instructions adapted under license by [facility] ([facility]).  If you have questions about a medical condition or this instruction, always ask your healthcare professional. [place] [address] any warranty or liability for your use of this information.

## 2021-02-15 NOTE — PROGRESS NOTES
SUBJECTIVE:  Pt is a of 21 y.o. female comes in today with   Chief Complaint   Patient presents with    Heartburn     Pt states she has heart burn and abdominal. She states her arms and legs were tingling.  Dental Injury     Pt states her tooth is broken       Patient presenting today for evaluation of chest pain since 2/4/21. Describes pain as intermittent burning pain. Center of chest to under left breast and stomach. Associated with heartburn and nausea. Last night pain was severe and associated with numbness and tingling in arms and legs, sensation of rapid heart rate. No previous eval. Prior treatment TUMS with improvement    Toothache to right upper molar. States part of tooth fell off. C/ swelling to gum and terrible pain. Has appointment with dentist 3/4/21    Anxiety: started Sertraline 3 days ago after online screening. No eval with PCP or MD. [facility] for 3 days and experienced symptoms mentioned above so stopped taking. Prior to Visit Medications    Medication Sig Taking? Authorizing Provider   YASMIN 3-0.02 MG per tablet  Yes Historical Provider, MD   sertraline (ZOLOFT) 50 MG tablet Take 50 mg by mouth daily Yes Historical Provider, MD         Patient's allergies, past medical, surgical, social and family histories werereviewed and updated as appropriate. Review of Systems   Constitutional: Positive for appetite change (decreased) and fatigue. Negative for chills and fever. HENT:        Right upper molar toothache     Respiratory: Negative for shortness of breath. Cardiovascular: Positive for chest pain. Negative for palpitations. Gastrointestinal: Positive for abdominal pain (epigastric burning) and nausea. Negative for constipation, diarrhea and vomiting. Genitourinary: Negative for dysuria, flank pain, frequency, hematuria and urgency. Musculoskeletal: Negative for back pain. Psychiatric/Behavioral: Positive for sleep disturbance. The patient is nervous/anxious.           Physical Exam  Vitals signs reviewed. Constitutional:       Appearance: Normal appearance. She is well-developed. HENT:      Mouth/Throat:      Comments: Erythema and swelling noted to right upper gum at molar    Cardiovascular:      Rate and Rhythm: Normal rate and regular rhythm. Heart sounds: Normal heart sounds. Pulmonary:      Effort: Pulmonary effort is normal.      Breath sounds: Normal breath sounds. Abdominal:      General: Bowel sounds are normal. There is no distension. Palpations: Abdomen is soft. Abdomen is not rigid. Tenderness: There is no abdominal tenderness. There is no guarding or rebound. Skin:     General: Skin is warm and dry. Neurological:      Mental Status: She is alert and oriented to person, place, and time. Vitals:    02/15/21 1051   BP: 126/84   Pulse: 103   SpO2: 98%   Weight: 182 lb 9.6 oz (82.8 kg)         EKG: normal EKG, normal sinus rhythm, there are no previous tracings available for comparison. Reviewed by Dr. Cam Uriostegui:  1. Chest pain, unspecified type    2. Rapid heart rate    3. Dyspepsia    4. Dentalgia    5. Anxiety        PLAN:  1. Chest pain, unspecified type  New problem  -     EKG 12 lead: WNL  Suspect related to heartburn  Will treat with Prilosec. May take TUMs prn  Hastings diet for 3-4 days  Criteria for emergent care reviewed    2. Rapid heart rate  New problem  Resolved last night  -     EKG 12 lead    3. Dyspepsia  New problem  New start-     omeprazole (PRILOSEC) 40 MG delayed release capsule; Take 1 capsule by mouth every morning (before breakfast)    4. Dentalgia  Trial-     amoxicillin (AMOXIL) 500 MG capsule; Take 1 capsule by mouth 3 times daily for 7 days  Keep appointment with dentist 3/4/21    5. Anxiety  Stop Sertraline, will re-evaluate at 1 month follow up    See pt instructions  F/u 1 month, sooner prn  Discussed use, benefit, and side effects of prescribed medications. All patient questions answered.   Pt voiced understanding.

## 2021-06-15 ENCOUNTER — OFFICE VISIT (OUTPATIENT)
Dept: FAMILY MEDICINE CLINIC | Age: 21
End: 2021-06-15
Payer: COMMERCIAL

## 2021-06-15 VITALS
WEIGHT: 194 LBS | OXYGEN SATURATION: 99 % | SYSTOLIC BLOOD PRESSURE: 138 MMHG | HEART RATE: 89 BPM | HEIGHT: 66 IN | DIASTOLIC BLOOD PRESSURE: 78 MMHG | BODY MASS INDEX: 31.18 KG/M2

## 2021-06-15 DIAGNOSIS — M25.561 ARTHRALGIA OF BOTH KNEES: Primary | ICD-10-CM

## 2021-06-15 DIAGNOSIS — M25.562 ARTHRALGIA OF BOTH KNEES: Primary | ICD-10-CM

## 2021-06-15 PROCEDURE — 99213 OFFICE O/P EST LOW 20 MIN: CPT | Performed by: NURSE PRACTITIONER

## 2021-06-15 PROCEDURE — G8427 DOCREV CUR MEDS BY ELIG CLIN: HCPCS | Performed by: NURSE PRACTITIONER

## 2021-06-15 PROCEDURE — 1036F TOBACCO NON-USER: CPT | Performed by: NURSE PRACTITIONER

## 2021-06-15 PROCEDURE — G8417 CALC BMI ABV UP PARAM F/U: HCPCS | Performed by: NURSE PRACTITIONER

## 2021-06-15 SDOH — ECONOMIC STABILITY: FOOD INSECURITY: WITHIN THE PAST 12 MONTHS, THE FOOD YOU BOUGHT JUST DIDN'T LAST AND YOU DIDN'T HAVE MONEY TO GET MORE.: NEVER TRUE

## 2021-06-15 SDOH — ECONOMIC STABILITY: FOOD INSECURITY: WITHIN THE PAST 12 MONTHS, YOU WORRIED THAT YOUR FOOD WOULD RUN OUT BEFORE YOU GOT MONEY TO BUY MORE.: NEVER TRUE

## 2021-06-15 ASSESSMENT — SOCIAL DETERMINANTS OF HEALTH (SDOH): HOW HARD IS IT FOR YOU TO PAY FOR THE VERY BASICS LIKE FOOD, HOUSING, MEDICAL CARE, AND HEATING?: NOT HARD AT ALL

## 2021-06-15 ASSESSMENT — ENCOUNTER SYMPTOMS: RESPIRATORY NEGATIVE: 1

## 2021-06-15 NOTE — PROGRESS NOTES
6/15/2021     Micheline Richards (:  2000) is a 24 y.o. female, here for evaluation of the following medical concerns:    Chief Complaint   Patient presents with    Leg Pain     no injuries r/o RA     Patient arrives today with complaints of pain, swelling and redness intermittently in bilateral knees. States this has been ongoing for months. She did have a negative RH factor, sed rate and CRP in October. She does have a fraternal twin who has rheumatoid arthritis and a cousin who has similar symptoms and has arthritis as a younger adult and is seeing a rheumatologist.  She denies fever, fatigue, rash or recent illness. Review of Systems   Constitutional: Negative for fatigue and fever. Respiratory: Negative. Cardiovascular: Negative. Musculoskeletal:        Bilateral knee pain       Prior to Visit Medications    Medication Sig Taking? Authorizing Provider   YASMIN 3-0.02 MG per tablet  Yes Historical Provider, MD        Social History     Tobacco Use    Smoking status: Never Smoker    Smokeless tobacco: Never Used   Vaping Use    Vaping Use: Never used   Substance Use Topics    Alcohol use: Never    Drug use: Never        Vitals:    06/15/21 1455   BP: 138/78   Pulse: 89   SpO2: 99%   Weight: 194 lb (88 kg)   Height: 5' 5.5\" (1.664 m)     Estimated body mass index is 31.79 kg/m² as calculated from the following:    Height as of this encounter: 5' 5.5\" (1.664 m). Weight as of this encounter: 194 lb (88 kg). Physical Exam  Constitutional:       General: She is not in acute distress. Appearance: Normal appearance. She is obese. She is not ill-appearing. Cardiovascular:      Rate and Rhythm: Normal rate and regular rhythm. Heart sounds: Normal heart sounds, S1 normal and S2 normal. No murmur heard. Pulmonary:      Effort: Pulmonary effort is normal.      Breath sounds: Normal breath sounds and air entry.    Musculoskeletal:      Right knee: Normal.      Left knee: Normal. Neurological:      Mental Status: She is alert. ASSESSMENT/PLAN:  1. Arthralgia of both knees  Patient requesting referral to rheumatology  - External Referral To Rheumatology  Did not feel necessary to repeat normal labs from October at this time. Return in about 6 months (around 12/15/2021).

## 2021-07-06 ENCOUNTER — PATIENT MESSAGE (OUTPATIENT)
Dept: FAMILY MEDICINE CLINIC | Age: 21
End: 2021-07-06

## 2021-07-06 NOTE — TELEPHONE ENCOUNTER
From: Evangelina Vigil  To: Princess Duval APRN - CNP  Sent: 7/6/2021 1:16 PM EDT  Subject: Visit Follow-Up Question    Hello. Sorry to bother you! I called the RA office and they told me to ask you to send all of my labs and office notes to them before I make an appointment .

## 2021-09-11 ENCOUNTER — NURSE TRIAGE (OUTPATIENT)
Dept: OTHER | Facility: CLINIC | Age: 21
End: 2021-09-11

## 2021-09-11 NOTE — TELEPHONE ENCOUNTER
Reason for Disposition   SEVERE sore throat pain    Answer Assessment - Initial Assessment Questions  1. ONSET: \"When did the throat start hurting? \" (Hours or days ago)       Onset was yesterday    2. SEVERITY: \"How bad is the sore throat? \" (Scale 1-10; mild, moderate or severe)    - MILD (1-3):  doesn't interfere with eating or normal activities    - MODERATE (4-7): interferes with eating some solids and normal activities    - SEVERE (8-10):  excruciating pain, interferes with most normal activities    - SEVERE DYSPHAGIA: can't swallow liquids, drooling      Pain level is 7-8/10 - took Sudafed PM last night, cough lozenges    3. STREP EXPOSURE: \"Has there been any exposure to strep within the past week? \" If so, ask: \"What type of contact occurred? \"       No    4. VIRAL SYMPTOMS: Lyndal Fly there any symptoms of a cold, such as a runny nose, cough, hoarse voice or red eyes? \"       Sore throat, ears hurt, headache, no cough, neck is actually sore (like she slept on it wrong)    5. FEVER: \"Do you have a fever? \" If so, ask: \"What is your temperature, how was it measured, and when did it start? \"      No feer    6. PUS ON THE TONSILS: \"Is there pus on the tonsils in the back of your throat? \"      No pus on the tonsils (back of tongue looks white)    7. OTHER SYMPTOMS: \"Do you have any other symptoms? \" (e.g., difficulty breathing, headache, rash)      See time 4    8. PREGNANCY: \"Is there any chance you are pregnant? \" \"When was your last menstrual period? \"      No    Protocols used: SORE THROAT-ADULT-OH    Received call from Yeimi Collins at [facility] with [employer] Complaint. Brief description of triage: Patient report a sore throat that is a 7-8/10 in pain level. Throat is red and tongue is covered in white near the back. Triage indicates for patient to be seen today in an Urgent/Walk in care.     Care advice provided, patient verbalizes understanding; denies any other questions or concerns; instructed to call back for any new or worsening symptoms. Attention Provider: Thank you for allowing me to participate in the care of your patient. The patient was connected to triage in response to information provided to the E[facility]. Please do not respond through this encounter as the response is not directed to a shared pool.

## 2021-09-13 ENCOUNTER — TELEPHONE (OUTPATIENT)
Dept: FAMILY MEDICINE CLINIC | Age: 21
End: 2021-09-13

## 2021-09-13 DIAGNOSIS — J02.9 SORE THROAT: Primary | ICD-10-CM

## 2021-09-13 PROCEDURE — 87880 STREP A ASSAY W/OPTIC: CPT | Performed by: NURSE PRACTITIONER

## 2021-09-13 NOTE — TELEPHONE ENCOUNTER
----- Message from MONTANA Hightower CNP sent at 9/13/2021  7:00 AM EDT -----  Regarding: sore throat  Please call patient and put on schedule tomorrow for virtual visit with me. Let her know I would like to her to go get covid tested at [facility] this morning. I would also like her strep tested at office today. Orders are placed.       [address] [address]  Tel: [phone]  Testing can be done 8:00-12:00

## 2021-09-13 NOTE — TELEPHONE ENCOUNTER
----- Message from MONTANA Chavis CNP sent at 9/13/2021  7:00 AM EDT -----  Regarding: sore throat  Please call patient and put on schedule tomorrow for virtual visit with me. Let her know I would like to her to go get covid tested at [facility] this morning. I would also like her strep tested at office today. Orders are placed.       [facility] - [address]  Tel: [phone]  Testing can be done 8:00-12:00

## 2021-09-14 ENCOUNTER — VIRTUAL VISIT (OUTPATIENT)
Dept: FAMILY MEDICINE CLINIC | Age: 21
End: 2021-09-14
Payer: COMMERCIAL

## 2021-09-14 DIAGNOSIS — J02.9 SORE THROAT: Primary | ICD-10-CM

## 2021-09-14 PROCEDURE — 99213 OFFICE O/P EST LOW 20 MIN: CPT | Performed by: NURSE PRACTITIONER

## 2021-09-14 ASSESSMENT — ENCOUNTER SYMPTOMS
GASTROINTESTINAL NEGATIVE: 1
COUGH: 0
SHORTNESS OF BREATH: 0
SINUS PRESSURE: 0
SINUS PAIN: 0
SORE THROAT: 1
RESPIRATORY NEGATIVE: 1

## 2021-09-14 NOTE — TELEPHONE ENCOUNTER
Patient states she went to urgent care and had a strep and covid test Saturday and they both came back negative.  Patient scheduled for vv

## 2021-09-14 NOTE — PROGRESS NOTES
2021    TELEHEALTH EVALUATION -- Audio/Visual (During IMIKO-05 public health emergency)    Pennie Morris (:  2000) has requested an audio/video evaluation for the following concern(s):    Has been feeling bad since Friday. Started with sore throat. Painful to swallow. Complains of headache. She was able to work on Saturday. She is vaccinated from Covid. She was seen at Urgent Care and was tested negative for strep and covid. She was placed on amoxicillin for unknown bacterial illness. She is feeling better today. Review of Systems   Constitutional: Negative. Negative for fever. HENT: Positive for sore throat. Negative for congestion, ear pain, sinus pressure and sinus pain. Respiratory: Negative. Negative for cough and shortness of breath. Cardiovascular: Negative. Gastrointestinal: Negative. Genitourinary: Negative. Neurological: Positive for headaches. Prior to Visit Medications    Medication Sig Taking? Authorizing Provider   YASMIN 3-0.02 MG per tablet  Yes Historical Provider, MD     No past medical history on file. No past surgical history on file. Family History   Problem Relation Age of Onset    Arthritis Sister      No Known Allergies  Social History     Tobacco Use    Smoking status: Never Smoker    Smokeless tobacco: Never Used   Vaping Use    Vaping Use: Never used   Substance Use Topics    Alcohol use: Never    Drug use: Never        PHYSICAL EXAMINATION:  Vital Signs: (As obtained by patient/caregiver or practitioner observation)  There were no vitals taken for this visit. Respiratory rate appears normal  Constitutional: Appears well-developed and well-nourished. No apparent distress    Mental status: Alert and awake. Oriented to person/place/yuni. Able to follow commands    Eyes: EOM normal. Sclera normal. No discharge visible  HENT: Normocephalic, atraumatic.    Mouth/Throat: Mucous membranes are moist. External Ears Normal    Neck: No visualized mass   Pulmonary/Chest: Respiratory effort normal.  No visualized signs of difficulty breathing or respiratory distress        Musculoskeletal:  Normal range of motion of neck  Neurological: No Facial Asymmetry (Cranial nerve 7 motor function) (limited exam to video visit). No gaze palsy       Skin:  No significant exanthematous lesions or discoloration noted on facial skin       Psychiatric: Normal Affect. No Hallucinations          ASSESSMENT/PLAN:  1. Sore throat  Push fluids  Rest  Tylenol/Motrin PRN pain/fever  Warm salt water gargles 3-4 times per day    Return if symptoms worsen or fail to improve. Ailyn Perez is a 24 y.o. female being evaluated by a Virtual Visit (video visit) encounter to address concerns as mentioned above. A caregiver was present when appropriate. Due to this being a TeleHealth encounter (During [facility]- public health emergency), evaluation of the following organ systems was limited: Vitals/Constitutional/EENT/Resp/CV/GI//MS/Neuro/Skin/Heme-Lymph-Imm. Pursuant to the emergency declaration under the [address] [place] [address], [address] [place] [address] authority and the [employer] Act, this Virtual Visit was conducted with patient's (and/or legal guardian's) consent, to reduce the patient's risk of exposure to COVID-19 and provide necessary medical care. The patient (and/or legal guardian) has also been advised to contact this office for worsening conditions or problems, and seek emergency medical treatment and/or call 911 if deemed necessary. Patient identification was verified at the start of the visit: Yes    Total time spent on this encounter: 15 minutes    Services were provided through a video synchronous discussion virtually to substitute for in-person clinic visit. Patient and provider were located at their individual homes.     --Azucena Nagel, MONTANA Saenz CNP on 9/14/2021 at 1:42 PM    An electronic signature was used to authenticate this note. Fartun Lui

## 2021-09-21 NOTE — PROGRESS NOTES
2021  Patient Name: Shilo Jenkins  : 2000  Medical Record: [id number]      ASSESSMENT AND PLAN    Assessment/Plan:      ASSESSMENT:    1. Inflammatory arthritis        PLAN:     Ed Gunter was seen today for establish care. Diagnoses and all orders for this visit:    Inflammatory arthritis  -     CBC Auto Differential; Future  -     Comprehensive Metabolic Panel; Future  -     C-Reactive Protein; Future  -     Sedimentation Rate; Future  -     Rheumatoid Factor; Future  -     Cyclic Citrul Peptide Antibody, IgG; Future  -     Hepatitis B Core Antibody, IgM; Future  -     Hepatitis B Surface Antigen; Future  -     Hepatitis C Antibody; Future  -     XR HAND RIGHT (MIN 3 VIEWS); Future  -     XR HAND LEFT (MIN 3 VIEWS); Future  -     XR KNEE RIGHT (3 VIEWS); Future  -     XR KNEE LEFT (3 VIEWS); Future  -     Miscellaneous Sendout 1; Future  -     TSH WITH REFLEX TO FT4; Future  -     HLA-B27 Antigen; Future  -     predniSONE (DELTASONE) 5 MG tablet; Take 3 tabs daily for 10 days and then 2 tabs daily    History suggestive of inflammatory arthritis, active synovitis on joint exam, twin sister with rheumatoid arthritis-most likely possibility rheumatoid arthritis  We will obtain blood work to evaluate further for rheumatoid arthritis/systemic rheumatic disease  We will obtain baseline CBC, CMP, hepatitis panel, hand and knee x-rays  We will start prednisone 15 mg daily for 10 days and then 10 mg daily  DMARD therapy after doing blood work    The patient indicates understanding of these issues and agrees with the plan. Return in about 6 weeks (around 11/3/2021). The risks and benefits of my recommendations, as well as other treatment options, benefits and side effects werediscussed with the patient. All questions were answered.     I reviewed patient's history, referral documents and electronic medical records  Copy of consult note is being routedelectronically/faxed to referring physician skin tightening  Eyes: negative for visual disturbance and persistent redness, discharge from eyes   ENT: - No tinnitus, loss of hearing, vertigo, or recurrent ear infections.  - No history of nasal/oral ulcers. - No history of dry eyes/dry mouth  Cardiovascular: No history of pericarditis, chest pain or murmur or palpitations  Respiratory: No shortness of breath, cough or history of interstitial lung disease. No history of pleurisy. No history of tuberculosis or atypical infections. Gastrointestinal: No history of heart burn, dysphagia or esophageal dysmotility. No change in bowel habits or any inflammatory bowel disease. Genitourinary: No history of renal disease, miscarriages. Hematologic/Lymphatic: No or bleeding, blood clots or swollen lymph nodes. Neurological: No history of seizure or focal weakness. No history of neuropathies, paresthesias or hyperesthesias, facial droop, diplopia  Psychiatric: No history of bipolar disease, anxiety, depression  Endocrine: Denies any polyuria, polydipsia and osteoporosis  Allergic/Immunologic: No nasal congestion or hives. I have reviewed patients Past medical History, Social History and Family History as mentioned in her chart and this remains unchanged fromprevious. History reviewed. No pertinent past medical history. History reviewed. No pertinent surgical history.   Social History     Socioeconomic History    Marital status: Single     Spouse name: Not on file    Number of children: Not on file    Years of education: Not on file    Highest education level: Not on file   Occupational History    Not on file   Tobacco Use    Smoking status: Never Smoker    Smokeless tobacco: Never Used   Vaping Use    Vaping Use: Some days   Substance and Sexual Activity    Alcohol use: Never    Drug use: Never    Sexual activity: Not Currently   Other Topics Concern    Not on file   Social History Narrative    Patient is currently working full time at [employer].  She states this is very stressful. She is very interested in attending nursing school in her future. Social Determinants of Health     Financial Resource Strain: Low Risk     Difficulty of Paying Living Expenses: Not hard at all   Food Insecurity: No Food Insecurity    Worried About Running Out of Food in the Last Year: Never true    Melissa of Food in the Last Year: Never true   Transportation Needs:     Lack of Transportation (Medical):      Lack of Transportation (Non-Medical):    Physical Activity:     Days of Exercise per Week:     Minutes of Exercise per Session:    Stress:     Feeling of Stress :    Social Connections:     Frequency of Communication with Friends and Family:     Frequency of Social Gatherings with Friends and Family:     Attends [employer] Services:     Active Member of Clubs or Organizations:     Attends Club or Organization Meetings:     Marital Status:    Intimate Partner Violence:     Fear of Current or Ex-Partner:     Emotionally Abused:     Physically Abused:     Sexually Abused:      Family History   Problem Relation Age of Onset    Arthritis Sister          PHYSICAL EXAM   Vitals:    09/22/21 0854   BP: 138/88   Pulse: 98   Weight: 193 lb (87.5 kg)     Physical Exam  Constitutional:  Well developed, well nourished, no acute distress, non-toxic appearance   Musculoskeletal:    RIGHT  Swell  Tender  ROM  LEFT  Swell  Tender  ROM    DIP2  0  0  FULL   0  0  FULL    DIP3  0  0  FULL   0  0  FULL    DIP4  0  0  FULL   0  0  FULL    DIP5  0  0  FULL   0  0  FULL    PIP1  0  0  FULL   0  0  FULL    PIP2  0  0  FULL   0  0  FULL    PIP3  0  0  FULL   0  0  FULL    PIP4  0  0  FULL   0  0  FULL    PIP5  0  0  FULL   0  0  FULL    MCP1  0  0  FULL   0  0  FULL    MCP2  0  0  FULL   0  0  FULL    MCP3  + + FULL   + + FULL    MCP4  0  0  FULL   0  0  FULL    MCP5  0  0  FULL   0  0  FULL    Wrist  0  0  FULL   0  0  FULL    Elbow  0  0  FULL   0  0  FULL Shouldr  0  0  FULL   0  0  FULL    Hip  0  0  FULL   0  0  FULL    Knee  ++ ++ FULL   ++ ++ FULL    Ankle  0  0  FULL   + + FULL    MTP1  0  0  FULL   0  0  FULL    MTP2  0  0  FULL   0  0  FULL    MTP3  0  0  FULL   0  0  FULL    MTP4  0  0  FULL   0  0  FULL    MTP5  0  0  FULL   0  0  FULL    IP1  0  0  FULL   0  0  FULL    IP2  0  0  FULL   0  0  FULL    IP3  0  0  FULL   0  0  FULL    IP4  0  0  FULL   0  0  FULL    IP5  0  0  FULL   0 0 FULL     Moderate effusion in bilateral knees, tenderness to palpation along medial and lateral joint line  Ambulates without assistance, normal gait  Neck: Full ROM, no tenderness,supple   Back- no tenderness. Eyes:  PERRL, extra ocular movements intact, conjunctiva normal   HEENT:  Atraumatic, normocephalic, external ears normal, oropharynx moist, no pharyngeal exudates. Respiratory:  No respiratory distress  GI:  Soft, nondistended, normal bowel sounds, nontender, noorganomegaly, no mass, no rebound, no guarding   :  No costovertebral angle tenderness   Integument:  Well hydrated, no rash or telangiectasias  Lymphatic:  No lymphadenopathy noted   Neurologic:   Alert & oriented x 3, CN 2-12 normal, no focal deficits noted. Sensations Intact. Muscle strength 5/5 proximally and distally in upper and lower extremities.    Psychiatric:  Speech and behavior appropriate           LABS AND IMAGING  Outside data reviewed and in HPI    Lab Results   Component Value Date    WBC 12.8 07/17/2020    RBC 4.89 07/17/2020    HGB 13.5 07/17/2020    HCT 41.7 07/17/2020     07/17/2020    MCV 85.4 07/17/2020    MCH 27.7 07/17/2020    MCHC 32.5 07/17/2020    RDW 13.6 07/17/2020    LYMPHOPCT 21.7 07/17/2020    MONOPCT 6.6 07/17/2020    BASOPCT 0.5 07/17/2020    MONOSABS 0.8 07/17/2020    LYMPHSABS 2.8 07/17/2020    EOSABS 0.1 07/17/2020    BASOSABS 0.1 07/17/2020       Chemistry        Component Value Date/Time     07/17/2020 1456    K 4.0 07/17/2020 1456     07/17/2020 1456    CO2 21 07/17/2020 1456    BUN 17 07/17/2020 1456    CREATININE 0.7 07/17/2020 1456        Component Value Date/Time    CALCIUM 9.7 07/17/2020 1456    ALKPHOS 66 07/17/2020 1456    AST 18 07/17/2020 1456    ALT 12 07/17/2020 1456    BILITOT <0.2 07/17/2020 1456          Lab Results   Component Value Date    SEDRATE 6 07/17/2020     Lab Results   Component Value Date    CRP <0.3 07/17/2020     Lab Results   Component Value Date    NAPOLEON Negative 07/17/2020     No results found for: RF, CCPABIGG  Lab Results   Component Value Date    NAPOLEON Negative 07/17/2020     No results found for: DSDNAG, DSDNAIGGIFA  No results found for: SSAROAB, SSALAAB  No results found for: SMAB, RNPAB  No results found for: CENTABIGG  No results found for: C3, C4, ACE  No results found for: Biscoe Setters, [id number]  No results found for: Rafat Pond  No results found for: Gonzalez Inocencio  Lab Results   Component Value Date    TSH 4.07 07/17/2020     No results found for: VITD25      ######################################################################    I thank you for giving me theopportunity to participate in [facility]. If you have any questions or concerns please feel free to contact me. I look forward to following  Ale Sky along with you. Electronically signed by: Roman Mcclure MD, MD, 9/22/2021 9:35 AM    Documentation was done using voice recognition dragon software. Every effort was made to ensure accuracy;however, inadvertent unintentional computerized transcription errors may be present.

## 2021-09-22 ENCOUNTER — OFFICE VISIT (OUTPATIENT)
Dept: RHEUMATOLOGY | Age: 21
End: 2021-09-22
Payer: COMMERCIAL

## 2021-09-22 ENCOUNTER — HOSPITAL ENCOUNTER (OUTPATIENT)
Age: 21
Discharge: HOME | End: 2021-09-22
Payer: COMMERCIAL

## 2021-09-22 ENCOUNTER — HOSPITAL ENCOUNTER (OUTPATIENT)
Dept: GENERAL RADIOLOGY | Age: 21
Discharge: HOME | End: 2021-09-22
Payer: COMMERCIAL

## 2021-09-22 VITALS
DIASTOLIC BLOOD PRESSURE: 88 MMHG | HEART RATE: 98 BPM | BODY MASS INDEX: 31.63 KG/M2 | WEIGHT: 193 LBS | SYSTOLIC BLOOD PRESSURE: 138 MMHG

## 2021-09-22 DIAGNOSIS — M19.90 INFLAMMATORY ARTHRITIS: Primary | ICD-10-CM

## 2021-09-22 DIAGNOSIS — M19.90 INFLAMMATORY ARTHRITIS: ICD-10-CM

## 2021-09-22 PROCEDURE — 99204 OFFICE O/P NEW MOD 45 MIN: CPT | Performed by: INTERNAL MEDICINE

## 2021-09-22 PROCEDURE — 73130 X-RAY EXAM OF HAND: CPT

## 2021-09-22 PROCEDURE — G8427 DOCREV CUR MEDS BY ELIG CLIN: HCPCS | Performed by: INTERNAL MEDICINE

## 2021-09-22 PROCEDURE — 73562 X-RAY EXAM OF KNEE 3: CPT

## 2021-09-22 PROCEDURE — G8417 CALC BMI ABV UP PARAM F/U: HCPCS | Performed by: INTERNAL MEDICINE

## 2021-09-22 RX ORDER — PREDNISONE 1 MG/1
TABLET ORAL
Qty: 70 TABLET | Refills: 1 | Status: SHIPPED | OUTPATIENT
Start: 2021-09-22 | End: 2021-10-27

## 2021-09-22 RX ORDER — AMOXICILLIN 875 MG/1
TABLET, COATED ORAL
COMMUNITY
Start: 2021-09-19 | End: 2021-10-27

## 2021-09-22 NOTE — LETTER
[facility] Rheumatology  [address] [id number]  Phone: [phone]  Fax: [phone]           Tosin Saleh MD      September 22, 2021     Patient: Mera Tao   MR Number: [id number]   YOB: 2000   Date of Visit: 9/22/2021       Dear Dr. Reyes Quan: Thank you for referring Mera Tao to me for evaluation/treatment. Below are the relevant portions of my assessment and plan of care. If you have questions, please do not hesitate to call me. I look forward to following Myna Mini along with you.     Sincerely,        Tosin Saleh MD    CC providers:  MONTANA Rogers CNP  [facility] [address] [place] [address]  Via In [facility]

## 2021-09-27 DIAGNOSIS — R76.8 POSITIVE ANA (ANTINUCLEAR ANTIBODY): Primary | ICD-10-CM

## 2021-10-27 ENCOUNTER — OFFICE VISIT (OUTPATIENT)
Dept: FAMILY MEDICINE CLINIC | Age: 21
End: 2021-10-27
Payer: COMMERCIAL

## 2021-10-27 VITALS
SYSTOLIC BLOOD PRESSURE: 122 MMHG | HEART RATE: 105 BPM | DIASTOLIC BLOOD PRESSURE: 64 MMHG | OXYGEN SATURATION: 98 % | BODY MASS INDEX: 32.65 KG/M2 | HEIGHT: 65 IN | WEIGHT: 196 LBS

## 2021-10-27 DIAGNOSIS — Z23 NEED FOR VACCINATION: ICD-10-CM

## 2021-10-27 DIAGNOSIS — Z02.0 SCHOOL PHYSICAL EXAM: Primary | ICD-10-CM

## 2021-10-27 PROCEDURE — 86580 TB INTRADERMAL TEST: CPT | Performed by: NURSE PRACTITIONER

## 2021-10-27 PROCEDURE — 90715 TDAP VACCINE 7 YRS/> IM: CPT | Performed by: NURSE PRACTITIONER

## 2021-10-27 PROCEDURE — G8482 FLU IMMUNIZE ORDER/ADMIN: HCPCS | Performed by: NURSE PRACTITIONER

## 2021-10-27 PROCEDURE — 90472 IMMUNIZATION ADMIN EACH ADD: CPT | Performed by: NURSE PRACTITIONER

## 2021-10-27 PROCEDURE — G8427 DOCREV CUR MEDS BY ELIG CLIN: HCPCS | Performed by: NURSE PRACTITIONER

## 2021-10-27 PROCEDURE — 1036F TOBACCO NON-USER: CPT | Performed by: NURSE PRACTITIONER

## 2021-10-27 PROCEDURE — 90471 IMMUNIZATION ADMIN: CPT | Performed by: NURSE PRACTITIONER

## 2021-10-27 PROCEDURE — G8417 CALC BMI ABV UP PARAM F/U: HCPCS | Performed by: NURSE PRACTITIONER

## 2021-10-27 PROCEDURE — 90674 CCIIV4 VAC NO PRSV 0.5 ML IM: CPT | Performed by: NURSE PRACTITIONER

## 2021-10-27 PROCEDURE — 99213 OFFICE O/P EST LOW 20 MIN: CPT | Performed by: NURSE PRACTITIONER

## 2021-10-27 RX ORDER — METHYLPREDNISOLONE 4 MG/1
TABLET ORAL
COMMUNITY
Start: 2021-09-26 | End: 2021-10-27

## 2021-10-27 ASSESSMENT — ENCOUNTER SYMPTOMS
RESPIRATORY NEGATIVE: 1
GASTROINTESTINAL NEGATIVE: 1

## 2021-10-27 NOTE — PROGRESS NOTES
10/27/2021     Hector Carmen (:  2000) is a 24 y.o. female, here for evaluation of the following medical concerns:    Chief Complaint   Patient presents with   [address] school     Patient arrives today for testing, physical and vaccinations to go to college. 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 Review of Systems   Constitutional: Negative. Respiratory: Negative. Cardiovascular: Negative. Gastrointestinal: Negative. Genitourinary: Negative. Neurological: Negative. Prior to Visit Medications    Medication Sig Taking? Authorizing Provider   YSAMIN 3-0.02 MG per tablet  Yes Historical Provider, MD        Social History     Tobacco Use    Smoking status: Never Smoker    Smokeless tobacco: Never Used   Vaping Use    Vaping Use: Some days   Substance Use Topics    Alcohol use: Never    Drug use: Never        Vitals:    10/27/21 1507   BP: 122/64   Pulse: 105   SpO2: 98%   Weight: 196 lb (88.9 kg)   Height: 5' 5\" (1.651 m)     Estimated body mass index is 32.62 kg/m² as calculated from the following:    Height as of this encounter: 5' 5\" (1.651 m). Weight as of this encounter: 196 lb (88.9 kg). Physical Exam  Constitutional:       General: She is not in acute distress. Appearance: Normal appearance. She is normal weight. She is not ill-appearing. Cardiovascular:      Rate and Rhythm: Normal rate and regular rhythm. Heart sounds: Normal heart sounds, S1 normal and S2 normal. No murmur heard. Pulmonary:      Effort: Pulmonary effort is normal.      Breath sounds: Normal breath sounds and air entry. Skin:     General: Skin is warm and dry. Capillary Refill: Capillary refill takes less than 2 seconds. Neurological:      General: No focal deficit present. Mental Status: She is alert. Psychiatric:         Mood and Affect: Mood normal.     ASSESSMENT/PLAN:  1. School physical exam    2.  Need for vaccination  Given today, patient will return on Friday for reading of TB testing  - Tdap (age 6y and older) IM (BOOSTRIX)  - VARICELLA ZOSTER ANTIBODY, IGG; Future  - Mantoux testing  - INFLUENZA, MDCK QUADV, 2 YRS AND OLDER, IM, PF, PREFILL SYR OR SDV, 0.5ML (FLUCELVAX QUADV, PF)    Return if symptoms worsen or fail to improve.

## 2021-10-27 NOTE — PROGRESS NOTES
Vaccine Information Sheet, \"Influenza - Inactivated\"  given to Yefri Warner, or parent/legal guardian of  Yefri Warner and verbalized understanding. Patient responses:    Have you ever had a reaction to a flu vaccine? No  Do you have any current illness? No  Have you ever had Guillian Ary Syndrome? No  Do you have a serious allergy to any of the follow: Neomycin, Polymyxin, Thimerosal, eggs or egg products? No    Flu vaccine given per order. Please see immunization tab. Risks and benefits explained. Current VIS given.       Immunizations Administered     Name Date Dose Route    PPD Test 10/27/2021 0.1 mL Intradermal    Site: Right arm    Lot: [id number]    NDC: 44111-565-57    Tdap (Boostrix, Adacel) 10/27/2021 0.5 mL Intramuscular    Site: Deltoid- Right    Lot: [id number]    NDC: 04667-085-21

## 2021-10-28 LAB — VARICELLA-ZOSTER VIRUS AB, IGG: NORMAL

## 2021-10-29 ENCOUNTER — NURSE ONLY (OUTPATIENT)
Dept: FAMILY MEDICINE CLINIC | Age: 21
End: 2021-10-29
Payer: COMMERCIAL

## 2021-10-29 DIAGNOSIS — Z23 NEED FOR VACCINATION: Primary | ICD-10-CM

## 2021-10-29 LAB
INDURATION: 0
TB SKIN TEST: 0

## 2021-10-29 PROCEDURE — 90471 IMMUNIZATION ADMIN: CPT | Performed by: NURSE PRACTITIONER

## 2021-10-29 PROCEDURE — 90716 VAR VACCINE LIVE SUBQ: CPT | Performed by: NURSE PRACTITIONER

## 2021-10-29 NOTE — PROGRESS NOTES
PPD Reading Note  PPD read and results entered in [employer] [employer] [employer]. Result: 0 mm induration.   Interpretation: 0  If test not read within 48-72 hours of initial placement, patient advised to repeat in other arm 1-3 weeks after this test.  Allergic reaction: no

## 2021-11-04 NOTE — PROGRESS NOTES
2021  Patient Name: Gardenia Bhagat  : 2000  Medical Record: [id number]      ASSESSMENT AND PLAN    Assessment/Plan:      ASSESSMENT:    1. Seronegative rheumatoid arthritis ([facility] [address]    2. Positive NAPOLEON (antinuclear antibody)        PLAN:     Marquis Lopez was seen today for follow-up. Diagnoses and all orders for this visit:    Seronegative rheumatoid arthritis ([address]  -     predniSONE (DELTASONE) 5 MG tablet; Take 3 tabs daily for 10 days,2.5 tabs for 10 days,2 tabs for 10 days,1.5 tab for 10 days,1 tab for 10 days, 0.5 tab for 10 days and stop  -     hydroxychloroquine (PLAQUENIL) 200 MG tablet; Take 1 tablet by mouth 2 times daily    Positive NAPOLEON (antinuclear antibody)    Seronegative rheumatoid arthritis-history suggestive of inflammatory arthritis, active synovitis on joint exam, twin sister with rheumatoid arthritis-most likely possibility rheumatoid arthritis  Reports response to steroids  RF, CCP negative, normal ESR and CRP. Hand and knee x-rays without any erosions or degenerative arthritis  Most likely seronegative rheumatoid arthritis  I will start Plaquenil 200 mg twice a day  Start prednisone taper    Positive NAPOLEON-positive any 1: 80 speckled pattern  dsDNA, anti-Smith, RNP, SSA/SSB negative  Implications of low titer positive NAPOLEON were discussed with patient. About 15-20% of normal healthy individuals at her age may have low titer positive NAPOLEON of unclear clinical significance. We will continue to monitor periodically  The patient indicates understanding of these issues and agrees with the plan. Return in about 3 months (around 2022). The risks and benefits of my recommendations, as well as other treatment options, benefits and side effects werediscussed with the patient. All questions were answered.     I reviewed patient's history, referral documents and electronic medical records  Copy of consult note is being routedelectronically/faxed to referring physician MEDICATIONS  Current Outpatient Medications   Medication Sig Dispense Refill    predniSONE (DELTASONE) 5 MG tablet Take 3 tabs daily for 10 days,2.5 tabs for 10 days,2 tabs for 10 days,1.5 tab for 10 days,1 tab for 10 days, 0.5 tab for 10 days and stop 105 tablet 0    hydroxychloroquine (PLAQUENIL) 200 MG tablet Take 1 tablet by mouth 2 times daily 60 tablet 5    YASMIN 3-0.02 MG per tablet        No current facility-administered medications for this visit. ALLERGIES  No Known Allergies      Comments  No specialty comments available. Background history:  Hayden Yun is a 24 y.o. female with no significant past medical history who is being seen for follow up evaluation of  joint pain. Symptoms started last year in July 2020. She has pain in the knees associated with swelling and stiffness on daily basis. She has difficulty going up and down the steps. Pain is worse in the morning and towards the end of the day. She has morning stiffness lasting for 20 to 30 minutes. She gets occasional severe flareups associated with redness which last for few hours. She also gets on and off pain in hands, wrists 2-3 times a week lasting for few hours to 5 days associated with swelling and stiffness. She gets occasional achiness in the ankles and the shoulders. She takes ibuprofen as needed which helps. Her twin sister has rheumatoid arthritis. She denies psoriasis, inflammatory bowel disease, inflammatory back pain, dactylitis, enthesitis, tenosynovitis or uveitis. She denies fever, weight loss or swollen glands. She denies malar rash, photosensitivity, oral/nasal ulcers, Raynaud's, alopecia, kidney disease, blood clots, miscarriages, pleurisy or pericarditis. Work-up in July 2020 showed negative NAPOLEON and normal ESR and CRP    Interim history: She presents for follow-up of seronegative rheumatoid arthritis and positive NAPOLEON. She is off of prednisone.   She reports improvement in joint pain, swelling and stiffness on prednisone. She still gets achiness with lifting heavy items. Morning stiffness is improved as well. Blood work showed negative RF, CCP, normal ESR and CRP. Hand and knee x-rays without any erosions or degenerative changes. She has low titer positive NAPOLEON 1: 80 speckled pattern. dsDNA, anti-Walker, RNP, SSA/SSB negative. She denies malar rash, photosensitivity, oral/nasal ulcers, dry eyes/dry mouth, Raynaud's, alopecia, kidney disease, pleurisy or pericarditis. HPI  Review of Systems    REVIEW OF SYSTEMS: Positive for fatigue, headaches, rash, easy bruising  Constitutional: No unanticipated weight loss or fevers. Integumentary: No photosensitivity, malar rash, livedo reticularis, alopecia and Raynaud's symptoms, sclerodactyly, skin tightening  Eyes: negative for visual disturbance and persistent redness, discharge from eyes   ENT: - No tinnitus, loss of hearing, vertigo, or recurrent ear infections.  - No history of nasal/oral ulcers. - No history of dry eyes/dry mouth  Cardiovascular: No history of pericarditis, chest pain or murmur or palpitations  Respiratory: No shortness of breath, cough or history of interstitial lung disease. No history of pleurisy. No history of tuberculosis or atypical infections. Gastrointestinal: No history of heart burn, dysphagia or esophageal dysmotility. No change in bowel habits or any inflammatory bowel disease. Genitourinary: No history of renal disease, miscarriages. Hematologic/Lymphatic: No or bleeding, blood clots or swollen lymph nodes. Neurological: No history of seizure or focal weakness. No history of neuropathies, paresthesias or hyperesthesias, facial droop, diplopia  Psychiatric: No history of bipolar disease, anxiety, depression  Endocrine: Denies any polyuria, polydipsia and osteoporosis  Allergic/Immunologic: No nasal congestion or hives.         I have reviewed patients Past medical History, Social History and Family History as mentioned in her chart and this remains unchanged fromprevious. History reviewed. No pertinent past medical history. History reviewed. No pertinent surgical history. Social History     Socioeconomic History    Marital status: Single     Spouse name: Not on file    Number of children: Not on file    Years of education: Not on file    Highest education level: Not on file   Occupational History    Not on file   Tobacco Use    Smoking status: Never Smoker    Smokeless tobacco: Never Used   Vaping Use    Vaping Use: Some days   Substance and Sexual Activity    Alcohol use: Never    Drug use: Never    Sexual activity: Not Currently   Other Topics Concern    Not on file   Social History Narrative    Patient is currently working full time at [employer].  She states this is very stressful. She is very interested in attending nursing school in her future. Social Determinants of Health     Financial Resource Strain: Low Risk     Difficulty of Paying Living Expenses: Not hard at all   Food Insecurity: No Food Insecurity    Worried About Running Out of Food in the Last Year: Never true    Melissa of Food in the Last Year: Never true   Transportation Needs:     Lack of Transportation (Medical):      Lack of Transportation (Non-Medical):    Physical Activity:     Days of Exercise per Week:     Minutes of Exercise per Session:    Stress:     Feeling of Stress :    Social Connections:     Frequency of Communication with Friends and Family:     Frequency of Social Gatherings with Friends and Family:     Attends Jewish Services:     Active Member of Clubs or Organizations:     Attends Club or Organization Meetings:     Marital Status:    Intimate Partner Violence:     Fear of Current or Ex-Partner:     Emotionally Abused:     Physically Abused:     Sexually Abused:      Family History   Problem Relation Age of Onset    Arthritis Sister          PHYSICAL EXAM   Vitals:    11/05/21 0901   BP: (!) 129/90   Pulse: 110   Weight: 197 lb 9.6 oz (89.6 kg)     Physical Exam  Constitutional:  Well developed, well nourished, no acute distress, non-toxic appearance   Musculoskeletal:    RIGHT  Swell  Tender  ROM  LEFT  Swell  Tender  ROM    DIP2  0  0  FULL   0  0  FULL    DIP3  0  0  FULL   0  0  FULL    DIP4  0  0  FULL   0  0  FULL    DIP5  0  0  FULL   0  0  FULL    PIP1  0  0  FULL   0  0  FULL    PIP2  0  0  FULL   0  0  FULL    PIP3  0  0  FULL   0  0  FULL    PIP4  0  0  FULL   0  0  FULL    PIP5  0  0  FULL   0  0  FULL    MCP1  0  0  FULL   0  0  FULL    MCP2  0  0  FULL   0  0  FULL    MCP3  + + FULL   0 0 FULL    MCP4  0  0  FULL   0  0  FULL    MCP5  0  0  FULL   0  0  FULL    Wrist  0  0  FULL   0  0  FULL    Elbow  0  0  FULL   0  0  FULL    Shouldr  0  0  FULL   0  0  FULL    Hip  0  0  FULL   0  0  FULL    Knee  0 0 FULL   0 0 FULL    Ankle  0  0  FULL   0 0 FULL    MTP1  0  0  FULL   0  0  FULL    MTP2  0  0  FULL   0  0  FULL    MTP3  0  0  FULL   0  0  FULL    MTP4  0  0  FULL   0  0  FULL    MTP5  0  0  FULL   0  0  FULL    IP1  0  0  FULL   0  0  FULL    IP2  0  0  FULL   0  0  FULL    IP3  0  0  FULL   0  0  FULL    IP4  0  0  FULL   0  0  FULL    IP5  0  0  FULL   0 0 FULL     Moderate effusion in bilateral knees, tenderness to palpation along medial and lateral joint line  Ambulates without assistance, normal gait  Neck: Full ROM, no tenderness,supple   Back- no tenderness. Eyes:  PERRL, extra ocular movements intact, conjunctiva normal   HEENT:  Atraumatic, normocephalic, external ears normal, oropharynx moist, no pharyngeal exudates. Respiratory:  No respiratory distress  GI:  Soft, nondistended, normal bowel sounds, nontender, noorganomegaly, no mass, no rebound, no guarding   :  No costovertebral angle tenderness   Integument:  Well hydrated, no rash or telangiectasias  Lymphatic:  No lymphadenopathy noted   Neurologic:   Alert & oriented x 3, CN 2-12 normal, no focal deficits noted. Sensations Intact. Muscle strength 5/5 proximally and distally in upper and lower extremities.    Psychiatric:  Speech and behavior appropriate           LABS AND IMAGING  Outside data reviewed and in HPI    Lab Results   Component Value Date    WBC 8.7 09/22/2021    RBC 4.84 09/22/2021    HGB 13.3 09/22/2021    HCT 39.8 09/22/2021     09/22/2021    MCV 82.3 09/22/2021    MCH 27.4 09/22/2021    MCHC 33.3 09/22/2021    RDW 13.6 09/22/2021    LYMPHOPCT 23.0 09/22/2021    MONOPCT 6.1 09/22/2021    BASOPCT 0.7 09/22/2021    MONOSABS 0.5 09/22/2021    LYMPHSABS 2.0 09/22/2021    EOSABS 0.1 09/22/2021    BASOSABS 0.1 09/22/2021       Chemistry        Component Value Date/Time     09/22/2021 0940    K 4.6 09/22/2021 0940     09/22/2021 0940    CO2 22 09/22/2021 0940    BUN 12 09/22/2021 0940    CREATININE 0.8 09/22/2021 0940        Component Value Date/Time    CALCIUM 9.6 09/22/2021 0940    ALKPHOS 65 09/22/2021 0940    AST 13 (L) 09/22/2021 0940    ALT 13 09/22/2021 0940    BILITOT <0.2 09/22/2021 0940          Lab Results   Component Value Date    SEDRATE 16 09/22/2021     Lab Results   Component Value Date    CRP <3.0 09/22/2021     Lab Results   Component Value Date    NAPOLEON Negative 07/17/2020     Lab Results   Component Value Date    RF <10.0 09/22/2021     Lab Results   Component Value Date    NAPOLEON Negative 07/17/2020    ANATITER 1:80 09/22/2021     No results found for: DSDNAG, DSDNAIGGIFA  No results found for: SSAROAB, SSALAAB  No results found for: SMAB, RNPAB  No results found for: CENTABIGG  No results found for: C3, C4, ACE  No results found for: [place], [id number]  No results found for: Hilaria Nielsen  No results found for: Pat Bloodgood  Lab Results   Component Value Date    TSHFT4 2.84 09/22/2021    TSH 4.07 07/17/2020     No results found for: VITD25    Hand and knee x-rays 9/22/2021  No evidence of degenerative arthritis or erosions    ######################################################################    I thank you for giving me theopportunity to participate in [facility]. If you have any questions or concerns please feel free to contact me. I look forward to following  Fleiberto Orourke along with you. Electronically signed by: Pham Mason MD, MD, 11/5/2021 9:27 AM    Documentation was done using voice recognition dragon software. Every effort was made to ensure accuracy;however, inadvertent unintentional computerized transcription errors may be present.

## 2021-11-05 ENCOUNTER — OFFICE VISIT (OUTPATIENT)
Dept: RHEUMATOLOGY | Age: 21
End: 2021-11-05
Payer: COMMERCIAL

## 2021-11-05 VITALS
SYSTOLIC BLOOD PRESSURE: 129 MMHG | DIASTOLIC BLOOD PRESSURE: 90 MMHG | HEART RATE: 110 BPM | BODY MASS INDEX: 32.88 KG/M2 | WEIGHT: 197.6 LBS

## 2021-11-05 DIAGNOSIS — R76.8 POSITIVE ANA (ANTINUCLEAR ANTIBODY): ICD-10-CM

## 2021-11-05 DIAGNOSIS — M06.00 SERONEGATIVE RHEUMATOID ARTHRITIS (HCC): Primary | ICD-10-CM

## 2021-11-05 PROCEDURE — G8427 DOCREV CUR MEDS BY ELIG CLIN: HCPCS | Performed by: INTERNAL MEDICINE

## 2021-11-05 PROCEDURE — G8417 CALC BMI ABV UP PARAM F/U: HCPCS | Performed by: INTERNAL MEDICINE

## 2021-11-05 PROCEDURE — 1036F TOBACCO NON-USER: CPT | Performed by: INTERNAL MEDICINE

## 2021-11-05 PROCEDURE — 99214 OFFICE O/P EST MOD 30 MIN: CPT | Performed by: INTERNAL MEDICINE

## 2021-11-05 PROCEDURE — G8482 FLU IMMUNIZE ORDER/ADMIN: HCPCS | Performed by: INTERNAL MEDICINE

## 2021-11-05 RX ORDER — PREDNISONE 1 MG/1
TABLET ORAL
Qty: 105 TABLET | Refills: 0 | Status: SHIPPED | OUTPATIENT
Start: 2021-11-05 | End: 2022-01-04 | Stop reason: SDUPTHER

## 2021-11-05 RX ORDER — MELOXICAM 15 MG/1
TABLET ORAL
COMMUNITY
Start: 2021-09-26 | End: 2021-11-05

## 2021-11-05 RX ORDER — HYDROXYCHLOROQUINE SULFATE 200 MG/1
200 TABLET, FILM COATED ORAL 2 TIMES DAILY
Qty: 60 TABLET | Refills: 5 | Status: SHIPPED | OUTPATIENT
Start: 2021-11-05 | End: 2022-02-02

## 2022-01-04 ENCOUNTER — PATIENT MESSAGE (OUTPATIENT)
Dept: RHEUMATOLOGY | Age: 22
End: 2022-01-04

## 2022-01-04 DIAGNOSIS — M06.00 SERONEGATIVE RHEUMATOID ARTHRITIS (HCC): ICD-10-CM

## 2022-01-04 RX ORDER — PREDNISONE 1 MG/1
TABLET ORAL
Qty: 105 TABLET | Refills: 0 | Status: SHIPPED | OUTPATIENT
Start: 2022-01-04 | End: 2022-06-23

## 2022-01-04 NOTE — TELEPHONE ENCOUNTER
From: Issac Finn  To: Dr. Tiny Lisa: 1/4/2022 8:56 AM EST  Subject: Swollen elbows     Goodmorning. I just wanted to let you know Dino Palacios been having pains here and there. Mostly in my knees, elbows, wrists, shoulders, and fingers. I woke up this morning and both of my elbows are pretty badly swollen. I took some pictures to show you.

## 2022-02-02 ENCOUNTER — OFFICE VISIT (OUTPATIENT)
Dept: RHEUMATOLOGY | Age: 22
End: 2022-02-02
Payer: COMMERCIAL

## 2022-02-02 VITALS
DIASTOLIC BLOOD PRESSURE: 82 MMHG | HEART RATE: 86 BPM | WEIGHT: 210.2 LBS | SYSTOLIC BLOOD PRESSURE: 134 MMHG | BODY MASS INDEX: 34.98 KG/M2

## 2022-02-02 DIAGNOSIS — M06.00 SERONEGATIVE RHEUMATOID ARTHRITIS (HCC): Primary | ICD-10-CM

## 2022-02-02 DIAGNOSIS — Z79.899 HIGH RISK MEDICATION USE: ICD-10-CM

## 2022-02-02 DIAGNOSIS — R76.8 POSITIVE ANA (ANTINUCLEAR ANTIBODY): ICD-10-CM

## 2022-02-02 PROCEDURE — 99214 OFFICE O/P EST MOD 30 MIN: CPT | Performed by: INTERNAL MEDICINE

## 2022-02-02 RX ORDER — FOLIC ACID 1 MG/1
1 TABLET ORAL DAILY
Qty: 30 TABLET | Refills: 5 | Status: SHIPPED | OUTPATIENT
Start: 2022-02-02 | End: 2022-03-30

## 2022-02-02 RX ORDER — ACETAMINOPHEN AND CODEINE PHOSPHATE 120; 12 MG/5ML; MG/5ML
SOLUTION ORAL
COMMUNITY
Start: 2021-12-14 | End: 2022-03-30

## 2022-02-02 NOTE — PROGRESS NOTES
2022  Patient Name: Flonnie Gilford  : 2000  Medical Record: [id number]      ASSESSMENT AND PLAN    Assessment/Plan:      ASSESSMENT:    1. Seronegative rheumatoid arthritis ([address]    2. Positive NAPOLEON (antinuclear antibody)    3. High risk medication use        PLAN:     Thor Barger was seen today for follow-up. Diagnoses and all orders for this visit:    Seronegative rheumatoid arthritis ([address]  -     methotrexate (RHEUMATREX) 2.5 MG chemo tablet; Take 4 tablets once a week for 2 weeks and then increase to 6 tablets once a week. Take all the tablets at the same time  -     folic acid (FOLVITE) 1 MG tablet; Take 1 tablet by mouth daily    Positive NAPOLEON (antinuclear antibody)    High risk medication use  -     ALT; Standing  -     AST; Standing  -     CBC Auto Differential; Standing  -     Creatinine, Serum; Standing    Seronegative rheumatoid arthritis-history suggestive of inflammatory arthritis, active synovitis on joint exam, twin sister with rheumatoid arthritis-most likely possibility rheumatoid arthritis  Reports response to steroids  RF, CCP negative, normal ESR and CRP. Hand and knee x-rays without any erosions or degenerative arthritis  Most likely seronegative rheumatoid arthritis  Reports headache on Plaquenil, I will discontinue Plaquenil  I will start methotrexate 4 tablets once a week for 2 weeks and then increase to 6 tablets once a week and start folic acid 1 mg daily  Advised to continue prednisone 2.5 mg daily until gone    Methotrexate increases the risk of infections, birth defects, liver toxicity, rare lung problems, probable malignancy and bone marrow toxicity and need to check the blood counts every month for first 3 months and then every 2 months and it was discussed in detail with the patient. Alcohol abstinence is advised while taking methotrexate. She is on oral contraceptives for birth control. Recommend flu and pneumonia vaccine.     Positive NAPOLEON-positive any 1: 80 speckled pattern  dsDNA, anti-Walker, RNP, SSA/SSB negative  Implications of low titer positive NAPOLEON were discussed with patient. About 15-20% of normal healthy individuals at her age may have low titer positive NAPOLEON of unclear clinical significance. We will continue to monitor periodically  The patient indicates understanding of these issues and agrees with the plan. Return in about 8 weeks (around 3/30/2022). The risks and benefits of my recommendations, as well as other treatment options, benefits and side effects werediscussed with the patient. All questions were answered. I reviewed patient's history, referral documents and electronic medical records  Copy of consult note is being routedelectronically/faxed to referring physician         MEDICATIONS  Current Outpatient Medications   Medication Sig Dispense Refill    norethindrone (MICRONOR) 0.35 MG tablet       methotrexate (RHEUMATREX) 2.5 MG chemo tablet Take 4 tablets once a week for 2 weeks and then increase to 6 tablets once a week. Take all the tablets at the same time 24 tablet 1    folic acid (FOLVITE) 1 MG tablet Take 1 tablet by mouth daily 30 tablet 5    predniSONE (DELTASONE) 5 MG tablet Take 3 tabs daily for 10 days,2.5 tabs for 10 days,2 tabs for 10 days,1.5 tab for 10 days,1 tab for 10 days, 0.5 tab for 10 days and stop 105 tablet 0     No current facility-administered medications for this visit. ALLERGIES  No Known Allergies      Comments  No specialty comments available. Background history:  Jackie Richter is a 24 y.o. female with no significant past medical history who is being seen for follow up evaluation of  joint pain. Symptoms started last year in July 2020. She has pain in the knees associated with swelling and stiffness on daily basis. She has difficulty going up and down the steps. Pain is worse in the morning and towards the end of the day. She has morning stiffness lasting for 20 to 30 minutes.   She gets occasional severe flareups associated with redness which last for few hours. She also gets on and off pain in hands, wrists 2-3 times a week lasting for few hours to 5 days associated with swelling and stiffness. She gets occasional achiness in the ankles and the shoulders. She takes ibuprofen as needed which helps. Her twin sister has rheumatoid arthritis. She denies psoriasis, inflammatory bowel disease, inflammatory back pain, dactylitis, enthesitis, tenosynovitis or uveitis. She denies fever, weight loss or swollen glands. She denies malar rash, photosensitivity, oral/nasal ulcers, Raynaud's, alopecia, kidney disease, blood clots, miscarriages, pleurisy or pericarditis. Work-up in July 2020 showed negative NAPOLEON and normal ESR and CRP    Interim history: She presents for follow-up of seronegative rheumatoid arthritis and positive NAPOLEON. She is on prednisone 2.5 mg daily. She restarted Plaquenil but she still has headaches with Plaquenil. She reports improvement in joint pain, swelling and stiffness on prednisone. She usually flares up in her elbows, shoulders and knees. Blood work showed negative RF, CCP, normal ESR and CRP. Hand and knee x-rays without any erosions or degenerative changes. She has low titer positive NAPOLEON 1: 80 speckled pattern. dsDNA, anti-Walker, RNP, SSA/SSB negative. She denies malar rash, photosensitivity, oral/nasal ulcers, dry eyes/dry mouth, Raynaud's, alopecia, kidney disease, pleurisy or pericarditis. HPI  Review of Systems    REVIEW OF SYSTEMS: Positive for fatigue, headaches, rash, easy bruising  Constitutional: No unanticipated weight loss or fevers.    Integumentary: No photosensitivity, malar rash, livedo reticularis, alopecia and Raynaud's symptoms, sclerodactyly, skin tightening  Eyes: negative for visual disturbance and persistent redness, discharge from eyes   ENT: - No tinnitus, loss of hearing, vertigo, or recurrent ear infections.  - No history of nasal/oral ulcers. - No history of dry eyes/dry mouth  Cardiovascular: No history of pericarditis, chest pain or murmur or palpitations  Respiratory: No shortness of breath, cough or history of interstitial lung disease. No history of pleurisy. No history of tuberculosis or atypical infections. Gastrointestinal: No history of heart burn, dysphagia or esophageal dysmotility. No change in bowel habits or any inflammatory bowel disease. Genitourinary: No history of renal disease, miscarriages. Hematologic/Lymphatic: No or bleeding, blood clots or swollen lymph nodes. Neurological: No history of seizure or focal weakness. No history of neuropathies, paresthesias or hyperesthesias, facial droop, diplopia  Psychiatric: No history of bipolar disease, anxiety, depression  Endocrine: Denies any polyuria, polydipsia and osteoporosis  Allergic/Immunologic: No nasal congestion or hives. I have reviewed patients Past medical History, Social History and Family History as mentioned in her chart and this remains unchanged fromprevious. History reviewed. No pertinent past medical history. History reviewed. No pertinent surgical history. Social History     Socioeconomic History    Marital status: Single     Spouse name: Not on file    Number of children: Not on file    Years of education: Not on file    Highest education level: Not on file   Occupational History    Not on file   Tobacco Use    Smoking status: Never Smoker    Smokeless tobacco: Never Used   Vaping Use    Vaping Use: Some days   Substance and Sexual Activity    Alcohol use: Never    Drug use: Never    Sexual activity: Not Currently   Other Topics Concern    Not on file   Social History Narrative    Patient is currently working full time at [employer].  She states this is very stressful. She is very interested in attending nursing school in her future.      Social Determinants of Health     Financial Resource Strain: Low Risk     Difficulty of Paying Living Expenses: Not hard at all   Food Insecurity: No Food Insecurity    Worried About Running Out of Food in the Last Year: Never true    Ran Out of Food in the Last Year: Never true   Transportation Needs:     Lack of Transportation (Medical): Not on file    Lack of Transportation (Non-Medical):  Not on file   Physical Activity:     Days of Exercise per Week: Not on file    Minutes of Exercise per Session: Not on file   Stress:     Feeling of Stress : Not on file   Social Connections:     Frequency of Communication with Friends and Family: Not on file    Frequency of Social Gatherings with Friends and Family: Not on file    Attends Evangelical Services: Not on file    Active Member of Clubs or Organizations: Not on file    Attends Club or Organization Meetings: Not on file    Marital Status: Not on file   Intimate Partner Violence:     Fear of Current or Ex-Partner: Not on file    Emotionally Abused: Not on file    Physically Abused: Not on file    Sexually Abused: Not on file   Housing Stability:     Unable to Pay for Housing in the Last Year: Not on file    Number of Places Lived in the Last Year: Not on file    Unstable Housing in the Last Year: Not on file     Family History   Problem Relation Age of Onset    Arthritis Sister          PHYSICAL EXAM   Vitals:    02/02/22 1023   BP: 134/82   Pulse: 86   Weight: 210 lb 3.2 oz (95.3 kg)     Physical Exam  Constitutional:  Well developed, well nourished, no acute distress, non-toxic appearance   Musculoskeletal:    RIGHT  Swell  Tender  ROM  LEFT  Swell  Tender  ROM    DIP2  0  0  FULL   0  0  FULL    DIP3  0  0  FULL   0  0  FULL    DIP4  0  0  FULL   0  0  FULL    DIP5  0  0  FULL   0  0  FULL    PIP1  0  0  FULL   0  0  FULL    PIP2  0  0  FULL   0  0  FULL    PIP3  0  0  FULL   0  0  FULL    PIP4  0  0  FULL   0  0  FULL    PIP5  0  0  FULL   0  0  FULL    MCP1  0  0  FULL   0  0  FULL    MCP2  0  0  FULL   0  0  FULL    MCP3  + + FULL 0.1 09/22/2021       Chemistry        Component Value Date/Time     09/22/2021 0940    K 4.6 09/22/2021 0940     09/22/2021 0940    CO2 22 09/22/2021 0940    BUN 12 09/22/2021 0940    CREATININE 0.8 09/22/2021 0940        Component Value Date/Time    CALCIUM 9.6 09/22/2021 0940    ALKPHOS 65 09/22/2021 0940    AST 13 (L) 09/22/2021 0940    ALT 13 09/22/2021 0940    BILITOT <0.2 09/22/2021 0940          Lab Results   Component Value Date    SEDRATE 16 09/22/2021     Lab Results   Component Value Date    CRP <3.0 09/22/2021     Lab Results   Component Value Date    NAPOLEON Negative 07/17/2020     Lab Results   Component Value Date    RF <10.0 09/22/2021     Lab Results   Component Value Date    NAPOLEON Negative 07/17/2020    ANATITER 1:80 09/22/2021     No results found for: DSDNAG, DSDNAIGGIFA  No results found for: SSAROAB, SSALAAB  No results found for: SMAB, RNPAB  No results found for: CENTABIGG  No results found for: C3, C4, ACE  No results found for: Ray Guerline, [id number]  No results found for: Gladys Makos  No results found for: [id number]  Lab Results   Component Value Date    TSHFT4 2.84 09/22/2021    TSH 4.07 07/17/2020     No results found for: VITD25    Hand and knee x-rays 9/22/2021  No evidence of degenerative arthritis or erosions    ######################################################################    I thank you for giving me theopportunity to participate in [facility] care. If you have any questions or concerns please feel free to contact me. I look forward to following  [facility] along with you. Electronically signed by: Louie Dukes MD, MD, 2/2/2022 10:59 AM    Documentation was done using voice recognition dragon software. Every effort was made to ensure accuracy;however, inadvertent unintentional computerized transcription errors may be present.

## 2022-02-03 LAB
ALT SERPL-CCNC: 18 U/L (ref 10–40)
AST SERPL-CCNC: 19 U/L (ref 15–37)
BASOPHILS ABSOLUTE: 0.1 K/UL (ref 0–0.2)
BASOPHILS RELATIVE PERCENT: 0.6 %
CREAT SERPL-MCNC: 0.7 MG/DL (ref 0.6–1.1)
EOSINOPHILS ABSOLUTE: 0.2 K/UL (ref 0–0.6)
EOSINOPHILS RELATIVE PERCENT: 1.4 %
GFR AFRICAN AMERICAN: >60
GFR NON-AFRICAN AMERICAN: >60
HCT VFR BLD CALC: 40.3 % (ref 36–48)
HEMOGLOBIN: 13.1 G/DL (ref 12–16)
LYMPHOCYTES ABSOLUTE: 2.2 K/UL (ref 1–5.1)
LYMPHOCYTES RELATIVE PERCENT: 19.6 %
MCH RBC QN AUTO: 27 PG (ref 26–34)
MCHC RBC AUTO-ENTMCNC: 32.4 G/DL (ref 31–36)
MCV RBC AUTO: 83.4 FL (ref 80–100)
MONOCYTES ABSOLUTE: 0.7 K/UL (ref 0–1.3)
MONOCYTES RELATIVE PERCENT: 6.4 %
NEUTROPHILS ABSOLUTE: 7.9 K/UL (ref 1.7–7.7)
NEUTROPHILS RELATIVE PERCENT: 72 %
PDW BLD-RTO: 13.8 % (ref 12.4–15.4)
PLATELET # BLD: 254 K/UL (ref 135–450)
PMV BLD AUTO: 9.3 FL (ref 5–10.5)
RBC # BLD: 4.83 M/UL (ref 4–5.2)
WBC # BLD: 11 K/UL (ref 4–11)

## 2022-03-30 ENCOUNTER — OFFICE VISIT (OUTPATIENT)
Dept: RHEUMATOLOGY | Age: 22
End: 2022-03-30
Payer: COMMERCIAL

## 2022-03-30 VITALS
BODY MASS INDEX: 37.77 KG/M2 | SYSTOLIC BLOOD PRESSURE: 134 MMHG | WEIGHT: 227 LBS | HEART RATE: 80 BPM | DIASTOLIC BLOOD PRESSURE: 86 MMHG

## 2022-03-30 DIAGNOSIS — Z79.899 HIGH RISK MEDICATION USE: ICD-10-CM

## 2022-03-30 DIAGNOSIS — R76.8 POSITIVE ANA (ANTINUCLEAR ANTIBODY): ICD-10-CM

## 2022-03-30 DIAGNOSIS — M06.00 SERONEGATIVE RHEUMATOID ARTHRITIS (HCC): ICD-10-CM

## 2022-03-30 DIAGNOSIS — M06.00 SERONEGATIVE RHEUMATOID ARTHRITIS (HCC): Primary | ICD-10-CM

## 2022-03-30 PROCEDURE — 99214 OFFICE O/P EST MOD 30 MIN: CPT | Performed by: INTERNAL MEDICINE

## 2022-03-30 RX ORDER — ULIPRISTAL ACETATE 30 MG/1
TABLET ORAL
COMMUNITY
Start: 2022-02-18 | End: 2022-03-30

## 2022-03-30 RX ORDER — SULFASALAZINE 500 MG/1
TABLET ORAL
Qty: 120 TABLET | Refills: 1 | Status: SHIPPED | OUTPATIENT
Start: 2022-03-30 | End: 2022-07-19 | Stop reason: SDUPTHER

## 2022-03-30 RX ORDER — NORETHINDRONE ACETATE AND ETHINYL ESTRADIOL 1MG-20(21)
KIT ORAL
COMMUNITY
Start: 2022-02-18 | End: 2022-06-23

## 2022-03-30 NOTE — PATIENT INSTRUCTIONS
-     sulfaSALAzine (AZULFIDINE) 500 MG tablet; Take 1 tablet twice a day for 2 weeks and then increase to 2 tablets twice a day  -     GLUCOSE 6 PHOSPHATE DEHYDROGENASE;  Future  Labs every 4 weeks

## 2022-03-30 NOTE — PROGRESS NOTES
3/30/2022  Patient Name: Kristina Barba  : 2000  Medical Record: [id number]      ASSESSMENT AND PLAN    Assessment/Plan:      ASSESSMENT:    1. Seronegative rheumatoid arthritis ([address]    2. Positive NAPOLEON (antinuclear antibody)    3. High risk medication use        PLAN:     Elier May was seen today for follow-up. Diagnoses and all orders for this visit:    Seronegative rheumatoid arthritis ([address]  -     sulfaSALAzine (AZULFIDINE) 500 MG tablet; Take 1 tablet twice a day for 2 weeks and then increase to 2 tablets twice a day  -     GLUCOSE 6 PHOSPHATE DEHYDROGENASE; Future    Positive NAPOLEON (antinuclear antibody)    High risk medication use    Seronegative rheumatoid arthritis-history suggestive of inflammatory arthritis, active synovitis on joint exam, twin sister with rheumatoid arthritis-most likely possibility rheumatoid arthritis  Reports response to steroids  RF, CCP negative, normal ESR and CRP. Hand and knee x-rays without any erosions or degenerative arthritis  Most likely seronegative rheumatoid arthritis  Allergic reaction on methotrexate [rash], Plaquenil caused headache  Active synovitis on joint exam  I will start sulfasalazine 500 mg twice a day for 2 weeks and then increase to 1000 mg twice a day  Sulfasalazine can cause live function abnormality, bone marrow toxicity, nausea and need to check the blood work every month for first 3 months and then every 2 months and were explained to the patient. Recommend flu and pneumonia vaccine. Positive NAPOLEON-positive any 1: 80 speckled pattern  dsDNA, anti-Smith, RNP, SSA/SSB negative  Implications of low titer positive NAPOLEON were discussed with patient. About 15-20% of normal healthy individuals at her age may have low titer positive NAPOLEON of unclear clinical significance. We will continue to monitor periodically  The patient indicates understanding of these issues and agrees with the plan. Return in about 8 weeks (around 2022).       The risks and benefits of my recommendations, as well as other treatment options, benefits and side effects werediscussed with the patient. All questions were answered. I reviewed patient's history, referral documents and electronic medical records  Copy of consult note is being routedelectronically/faxed to referring physician         MEDICATIONS  Current Outpatient Medications   Medication Sig Dispense Refill    BLISOVI FE 1/20 1-20 MG-MCG per tablet       sulfaSALAzine (AZULFIDINE) 500 MG tablet Take 1 tablet twice a day for 2 weeks and then increase to 2 tablets twice a day 120 tablet 1    predniSONE (DELTASONE) 5 MG tablet Take 3 tabs daily for 10 days,2.5 tabs for 10 days,2 tabs for 10 days,1.5 tab for 10 days,1 tab for 10 days, 0.5 tab for 10 days and stop 105 tablet 0     No current facility-administered medications for this visit. ALLERGIES  Allergies   Allergen Reactions    Methotrexate Derivatives Rash         Comments  No specialty comments available. Background history:  Issac Finn is a 24 y.o. female with no significant past medical history who is being seen for follow up evaluation of  joint pain. Symptoms started last year in July 2020. She has pain in the knees associated with swelling and stiffness on daily basis. She has difficulty going up and down the steps. Pain is worse in the morning and towards the end of the day. She has morning stiffness lasting for 20 to 30 minutes. She gets occasional severe flareups associated with redness which last for few hours. She also gets on and off pain in hands, wrists 2-3 times a week lasting for few hours to 5 days associated with swelling and stiffness. She gets occasional achiness in the ankles and the shoulders. She takes ibuprofen as needed which helps. Her twin sister has rheumatoid arthritis. She denies psoriasis, inflammatory bowel disease, inflammatory back pain, dactylitis, enthesitis, tenosynovitis or uveitis.   She denies fever, weight loss or swollen glands. She denies malar rash, photosensitivity, oral/nasal ulcers, Raynaud's, alopecia, kidney disease, blood clots, miscarriages, pleurisy or pericarditis. Work-up in July 2020 showed negative NAPOLEON and normal ESR and CRP    Interim history: She presents for follow-up of seronegative rheumatoid arthritis and positive NAPOLEON. She did not tolerate methotrexate [rash] and Plaquenil [headaches]. She continues to complain of pain and achiness in the joints especially in her knees and hands. Morning stiffness last for 10 minutes. Blood work showed negative RF, CCP, normal ESR and CRP. Hand and knee x-rays without any erosions or degenerative changes. She has low titer positive NAPOLEON 1: 80 speckled pattern. dsDNA, anti-Walker, RNP, SSA/SSB negative. She denies malar rash, photosensitivity, oral/nasal ulcers, dry eyes/dry mouth, Raynaud's, alopecia, kidney disease, pleurisy or pericarditis. HPI  Review of Systems    REVIEW OF SYSTEMS: Positive for fatigue, headaches, rash, easy bruising  Constitutional: No unanticipated weight loss or fevers. Integumentary: No photosensitivity, malar rash, livedo reticularis, alopecia and Raynaud's symptoms, sclerodactyly, skin tightening  Eyes: negative for visual disturbance and persistent redness, discharge from eyes   ENT: - No tinnitus, loss of hearing, vertigo, or recurrent ear infections.  - No history of nasal/oral ulcers. - No history of dry eyes/dry mouth  Cardiovascular: No history of pericarditis, chest pain or murmur or palpitations  Respiratory: No shortness of breath, cough or history of interstitial lung disease. No history of pleurisy. No history of tuberculosis or atypical infections. Gastrointestinal: No history of heart burn, dysphagia or esophageal dysmotility. No change in bowel habits or any inflammatory bowel disease. Genitourinary: No history of renal disease, miscarriages.   Hematologic/Lymphatic: No or bleeding, blood clots or swollen lymph nodes. Neurological: No history of seizure or focal weakness. No history of neuropathies, paresthesias or hyperesthesias, facial droop, diplopia  Psychiatric: No history of bipolar disease, anxiety, depression  Endocrine: Denies any polyuria, polydipsia and osteoporosis  Allergic/Immunologic: No nasal congestion or hives. I have reviewed patients Past medical History, Social History and Family History as mentioned in her chart and this remains unchanged fromprevious. Past Medical History:   Diagnosis Date    Seronegative rheumatoid arthritis ([facility] [address]      History reviewed. No pertinent surgical history. Social History     Socioeconomic History    Marital status: Single     Spouse name: Not on file    Number of children: Not on file    Years of education: Not on file    Highest education level: Not on file   Occupational History    Not on file   Tobacco Use    Smoking status: Never Smoker    Smokeless tobacco: Never Used   Vaping Use    Vaping Use: Some days   Substance and Sexual Activity    Alcohol use: Never    Drug use: Never    Sexual activity: Not Currently   Other Topics Concern    Not on file   Social History Narrative    Patient is currently working full time at [employer].  She states this is very stressful. She is very interested in attending nursing school in her future. Social Determinants of Health     Financial Resource Strain: Low Risk     Difficulty of Paying Living Expenses: Not hard at all   Food Insecurity: No Food Insecurity    Worried About Running Out of Food in the Last Year: Never true    Melissa of Food in the Last Year: Never true   Transportation Needs:     Lack of Transportation (Medical): Not on file    Lack of Transportation (Non-Medical):  Not on file   Physical Activity:     Days of Exercise per Week: Not on file    Minutes of Exercise per Session: Not on file   Stress:     Feeling of Stress : Not on file   Social Connections:  Frequency of Communication with Friends and Family: Not on file    Frequency of Social Gatherings with Friends and Family: Not on file    Attends Church Services: Not on file    Active Member of Clubs or Organizations: Not on file    Attends Club or Organization Meetings: Not on file    Marital Status: Not on file   Intimate Partner Violence:     Fear of Current or Ex-Partner: Not on file    Emotionally Abused: Not on file    Physically Abused: Not on file    Sexually Abused: Not on file   Housing Stability:     Unable to Pay for Housing in the Last Year: Not on file    Number of Places Lived in the Last Year: Not on file    Unstable Housing in the Last Year: Not on file     Family History   Problem Relation Age of Onset    Arthritis Sister          PHYSICAL EXAM   Vitals:    03/30/22 0842   BP: 134/86   Pulse: 80   Weight: 227 lb (103 kg)     Physical Exam  Constitutional:  Well developed, well nourished, no acute distress, non-toxic appearance   Musculoskeletal:    RIGHT  Swell  Tender  ROM  LEFT  Swell  Tender  ROM    DIP2  0  0  FULL   0  0  FULL    DIP3  0  0  FULL   0  0  FULL    DIP4  0  0  FULL   0  0  FULL    DIP5  0  0  FULL   0  0  FULL    PIP1  0  0  FULL   0  0  FULL    PIP2  0  0  FULL   0  0  FULL    PIP3  + + FULL   0  0  FULL    PIP4  + + FULL   0  0  FULL    PIP5  0  0  FULL   0  0  FULL    MCP1  0  0  FULL   0  0  FULL    MCP2  0  0  FULL   0  0  FULL    MCP3  + + FULL   0 0 FULL    MCP4  0  0  FULL   0  0  FULL    MCP5  0  0  FULL   0  0  FULL    Wrist  + + FULL   0  0  FULL    Elbow  0  0  FULL   0  0  FULL    Shouldr  0  0  FULL   0  0  FULL    Hip  0  0  FULL   0  0  FULL    Knee  0 0 FULL   0 0 FULL    Ankle  0  0  FULL   0 0 FULL    MTP1  0  0  FULL   0  0  FULL    MTP2  0  0  FULL   0  0  FULL    MTP3  0  0  FULL   0  0  FULL    MTP4  0  0  FULL   0  0  FULL    MTP5  0  0  FULL   0  0  FULL    IP1  0  0  FULL   0  0  FULL    IP2  0  0  FULL   0  0  FULL    IP3  0  0 FULL   0  0  FULL    IP4  0  0  FULL   0  0  FULL    IP5  0  0  FULL   0 0 FULL      tenderness to palpation along medial and lateral joint line in bilateral knees   Ambulates without assistance, normal gait  Neck: Full ROM, no tenderness,supple   Back- no tenderness. Eyes:  PERRL, extra ocular movements intact, conjunctiva normal   HEENT:  Atraumatic, normocephalic, external ears normal, oropharynx moist, no pharyngeal exudates. Respiratory:  No respiratory distress  GI:  Soft, nondistended, normal bowel sounds, nontender, noorganomegaly, no mass, no rebound, no guarding   :  No costovertebral angle tenderness   Integument:  Well hydrated, no rash or telangiectasias  Lymphatic:  No lymphadenopathy noted   Neurologic:   Alert & oriented x 3, CN 2-12 normal, no focal deficits noted. Sensations Intact. Muscle strength 5/5 proximally and distally in upper and lower extremities.    Psychiatric:  Speech and behavior appropriate           LABS AND IMAGING  Outside data reviewed and in HPI    Lab Results   Component Value Date    WBC 11.0 02/02/2022    RBC 4.83 02/02/2022    HGB 13.1 02/02/2022    HCT 40.3 02/02/2022     02/02/2022    MCV 83.4 02/02/2022    MCH 27.0 02/02/2022    MCHC 32.4 02/02/2022    RDW 13.8 02/02/2022    LYMPHOPCT 19.6 02/02/2022    MONOPCT 6.4 02/02/2022    BASOPCT 0.6 02/02/2022    MONOSABS 0.7 02/02/2022    LYMPHSABS 2.2 02/02/2022    EOSABS 0.2 02/02/2022    BASOSABS 0.1 02/02/2022       Chemistry        Component Value Date/Time     09/22/2021 0940    K 4.6 09/22/2021 0940     09/22/2021 0940    CO2 22 09/22/2021 0940    BUN 12 09/22/2021 0940    CREATININE 0.7 02/02/2022 1100        Component Value Date/Time    CALCIUM 9.6 09/22/2021 0940    ALKPHOS 65 09/22/2021 0940    AST 19 02/02/2022 1100    ALT 18 02/02/2022 1100    BILITOT <0.2 09/22/2021 0940          Lab Results   Component Value Date    SEDRATE 16 09/22/2021     Lab Results   Component Value Date    CRP <3.0 09/22/2021     Lab Results   Component Value Date    NAPOLEON Negative 07/17/2020     Lab Results   Component Value Date    RF <10.0 09/22/2021     Lab Results   Component Value Date    NAPOLEON Negative 07/17/2020    ANATITER 1:80 09/22/2021     No results found for: DSDNAG, DSDNAIGGIFA  No results found for: SSAROAB, SSALAAB  No results found for: SMAB, RNPAB  No results found for: CENTABIGG  No results found for: C3, C4, ACE  No results found for: Baudilio Cranker, [id number]  No results found for: Brooklyn Creed  No results found for: [id number]  Lab Results   Component Value Date    TSHFT4 2.84 09/22/2021    TSH 4.07 07/17/2020     No results found for: VITD25    Hand and knee x-rays 9/22/2021  No evidence of degenerative arthritis or erosions    ######################################################################    I thank you for giving me theopportunity to participate in [employer]. If you have any questions or concerns please feel free to contact me. I look forward to following  Agueda Llamas along with you. Electronically signed by: David Choe MD, MD, 3/30/2022 9:11 AM    Documentation was done using voice recognition dragon software. Every effort was made to ensure accuracy;however, inadvertent unintentional computerized transcription errors may be present.

## 2022-04-01 LAB — G-6-PD, QUANT: 13.9 U/G HB (ref 9.9–16.6)

## 2022-06-23 ENCOUNTER — OFFICE VISIT (OUTPATIENT)
Dept: FAMILY MEDICINE CLINIC | Age: 22
End: 2022-06-23
Payer: COMMERCIAL

## 2022-06-23 VITALS
HEIGHT: 65 IN | SYSTOLIC BLOOD PRESSURE: 121 MMHG | HEART RATE: 67 BPM | DIASTOLIC BLOOD PRESSURE: 80 MMHG | WEIGHT: 234 LBS | BODY MASS INDEX: 38.99 KG/M2 | OXYGEN SATURATION: 100 %

## 2022-06-23 DIAGNOSIS — F98.8 ADD (ATTENTION DEFICIT DISORDER) WITHOUT HYPERACTIVITY: Primary | ICD-10-CM

## 2022-06-23 PROCEDURE — 99213 OFFICE O/P EST LOW 20 MIN: CPT | Performed by: NURSE PRACTITIONER

## 2022-06-23 RX ORDER — DEXTROAMPHETAMINE SACCHARATE, AMPHETAMINE ASPARTATE, DEXTROAMPHETAMINE SULFATE AND AMPHETAMINE SULFATE 1.25; 1.25; 1.25; 1.25 MG/1; MG/1; MG/1; MG/1
5 TABLET ORAL DAILY
Qty: 30 TABLET | Refills: 0 | Status: SHIPPED | OUTPATIENT
Start: 2022-06-23 | End: 2022-07-22

## 2022-06-23 SDOH — ECONOMIC STABILITY: FOOD INSECURITY: WITHIN THE PAST 12 MONTHS, YOU WORRIED THAT YOUR FOOD WOULD RUN OUT BEFORE YOU GOT MONEY TO BUY MORE.: NEVER TRUE

## 2022-06-23 SDOH — ECONOMIC STABILITY: FOOD INSECURITY: WITHIN THE PAST 12 MONTHS, THE FOOD YOU BOUGHT JUST DIDN'T LAST AND YOU DIDN'T HAVE MONEY TO GET MORE.: NEVER TRUE

## 2022-06-23 ASSESSMENT — SOCIAL DETERMINANTS OF HEALTH (SDOH): HOW HARD IS IT FOR YOU TO PAY FOR THE VERY BASICS LIKE FOOD, HOUSING, MEDICAL CARE, AND HEATING?: SOMEWHAT HARD

## 2022-06-23 ASSESSMENT — PATIENT HEALTH QUESTIONNAIRE - PHQ9
2. FEELING DOWN, DEPRESSED OR HOPELESS: 0
SUM OF ALL RESPONSES TO PHQ QUESTIONS 1-9: 0
SUM OF ALL RESPONSES TO PHQ9 QUESTIONS 1 & 2: 0
SUM OF ALL RESPONSES TO PHQ QUESTIONS 1-9: 0
1. LITTLE INTEREST OR PLEASURE IN DOING THINGS: 0
SUM OF ALL RESPONSES TO PHQ QUESTIONS 1-9: 0
2. FEELING DOWN, DEPRESSED OR HOPELESS: 0
1. LITTLE INTEREST OR PLEASURE IN DOING THINGS: 0
SUM OF ALL RESPONSES TO PHQ9 QUESTIONS 1 & 2: 0
SUM OF ALL RESPONSES TO PHQ QUESTIONS 1-9: 0

## 2022-06-23 ASSESSMENT — ENCOUNTER SYMPTOMS: RESPIRATORY NEGATIVE: 1

## 2022-06-23 NOTE — LETTER
CONTROLLED SUBSTANCE MEDICATION AGREEMENT     Patient Name: Shilo Jenkins  Patient YOB: 2000   I understand, that controlled substance medications may be used to help better manage my symptoms and to improve my ability to function at home, work and in social settings. However, I also understand that these medications do have risks, which have been discussed with me, including possible development of physical or psychological dependence. I understand that successful treatment requires mutual trust and honesty between me and my provider. I understand and agree that following this Medication Agreement is necessary in continuing my provider-patient relationship and the success of my treatment plan. Explanation from my Provider: Benefits and Goals of Controlled Substance Medications: There are two potential goals for your treatment: (1) decreased pain and suffering (2) improved daily life functions. There are many possible treatments for your chronic condition(s). Alternatives such as physical therapy, yoga, massage, home daily exercise, meditation, relaxation techniques, injections, chiropractic manipulations, surgery, cognitive therapy, hypnosis and many medications that are not habit-forming may be used. Use of controlled substance medications may be helpful, but they are unlikely to resolve all symptoms or restore all function. Explanation from my Provider: Risks of Controlled Substance Medications:  Opioid pain medications: These medications can lead to problems such as addiction/dependence, sedation, lightheadedness/dizziness, memory issues, falls, constipation, nausea, or vomiting. They may also impair the ability to drive or operate machinery. Additionally, these medications may lower testosterone levels, leading to loss of bone strength, stamina and sex drive.   They may cause problems with breathing, sleep apnea and reduced coughing, which is especially dangerous for patients with lung disease. Overdose or dangerous interactions with alcohol and other medications may occur, leading to death. Hyperalgesia may develop, which means patients receiving opioids for the treatment of pain may become more sensitive to certain painful stimuli, and in some cases, experience pain from ordinarily non-painful stimuli. Women between the ages of 14-53 who could become pregnant should carefully weigh the risks and benefits of opioids with their physicians, as these medications increase the risk of pregnancy complications, including miscarriage,  delivery and stillbirth. It is also possible for babies to be born addicted to opioids. Opioid dependence withdrawal symptoms may include; feelings of uneasiness, increased pain, irritability, belly pain, diarrhea, sweats and goose-flesh. Benzodiazepines and non-benzodiazepine sleep medications: These medications can lead to problems such as addiction/dependence, sedation, fatigue, lightheadedness, dizziness, incoordination, falls, depression, hallucinations, and impaired judgment, memory and concentration. The ability to drive and operate machinery may also be affected. Abnormal sleep-related behaviors have been reported, including sleepwalking, driving, making telephone calls, eating, or having sex while not fully awake. These medications can suppress breathing and worsen sleep apnea, particularly when combined with alcohol or other sedating medications, potentially leading to death. Dependence withdrawal symptoms may include tremors, anxiety, hallucinations and seizures. Stimulants:  Common adverse effects include addiction/dependence, increased blood  pressure and heart rate, decreased appetite, nausea, involuntary weight loss, insomnia,                                                                                                                     Initials:_______   irritability, and headaches.   These risks may increase when these medications are combined with other stimulants, such as caffeine pills or energy drinks, certain weight loss supplements and oral decongestants. Dependence withdrawal symptoms may include depressed mood, loss of interest, suicidal thoughts, anxiety, fatigue, appetite changes and agitation. Testosterone replacement therapy:  Potential side effects include increased risk of stroke and heart attack, blood clots, increased blood pressure, increased cholesterol, enlarged prostate, sleep apnea, irritability/aggression and other mood disorders, and decreased fertility. I agree and understand that I and my prescriber have the following rights and responsibilities regarding my treatment plan:     1. MY RIGHTS:  To be informed of my treatment and medication plan. To be an active participant in my health and wellbeing. 2. MY RESPONSIBILITY AND UNDERSTANDING FOR USE OF MEDICATIONS   I will take medications at the dose and frequency as directed. For my safety, I will not increase or change how I take my medications without the recommendation of my healthcare provider.  I will actively participate in any program recommended by my provider which may improve function, including social, physical, psychological programs.  I will not take my medications with alcohol or other drugs not prescribed to me. I understand that drinking alcohol with my medications increases the chances of side effects, including reduced breathing rate and could lead to personal injury when operating machinery.  I understand that if I have a history of substance use disorders, including alcohol or other illicit drugs, that I may be at increased risk of addiction to my medications.  I agree to notify my provider immediately if I should become pregnant so that my treatment plan can be adjusted.    I agree and understand that I shall only receive controlled substance medications from the prescriber that signed this agreement unless there is written agreement among other prescribers of controlled substances outlining the responsibility of the medications being prescribed.  I understand that the if the controlled medication is not helping to achieve goals, the dosage may be tapered and no longer prescribed. 3. MY RESPONSIBILITY FOR COMMUNICATION / PRESCRIPTION RENEWALS   I agree that all controlled substance medications that I take will be prescribed only by my provider. If another healthcare provider prescribes me medication in an emergency, I will notify my provider within seventy-two (72) hours.  I will arrange for refills at the prescribed interval ONLY during regular office hours. I will not ask for refills earlier than agreed, after-hours, on holidays or weekends. Refills may take up to 72 hours for processing and prescriptions to reach the pharmacy.  I will inform my other health care providers that I am taking these medications and of the existence of this [id number]. In the event of an emergency, I will provide the same information to the emergency department prescribers.  I will keep my provider updated on the pharmacy I am using for controlled medication prescription filling. Initials:_______  4. MY RESPONSIBILITY FOR PROTECTING MEDICATIONS   I will protect my prescriptions and medications. I understand that lost or misplaced prescriptions will not be replaced.  I will keep medications only for my own use and will not share them with others. I will keep all medications away from children.  I agree that if my medications are adjusted or discontinued, I will properly dispose of any remaining medications. I understand that I will be required to dispose of any remaining controlled medications as, directed by my prescriber, prior to being provided with any prescriptions for other controlled medications.   Medication drop box locations can be found at: HitProtect.dk    5. MY RESPONSIBILITY WITH ILLEGAL DRUGS    I will not use illegal or street drugs or another person's prescription medications not prescribed to me.  If there are identified addiction type symptoms, then referral to a program may be provided by my provider and I agree to follow through with this recommendation. 6. MY RESPONSIBILITY FOR COOPERATION WITH INVESTIGATIONS   I understand that my provider will comply with any applicable law and may discuss my use and/or possible misuse/abuse of controlled substances and alcohol, as appropriate, with any health care provider involved in my care, pharmacist, or legal authority.  I authorize my provider and pharmacy to cooperate fully with law enforcement agencies (as permitted by law) in the investigation of any possible misuse, sale, or other diversion of my controlled substances.  I agree to waive any applicable privilege or right of privacy or confidentiality with respect to these authorizations. 7. PROVIDERS RIGHT TO MONITOR FOR SAFETY: PRESCRIPTION MONITORING / DRUG TESTING   I consent to drug/toxicology screening and will submit to a drug screen upon my providers request to assure I am only taking the prescribed drugs for my safety monitoring. I understand that a drug screen is a laboratory test in which a sample of my urine, blood or saliva is checked to see what drugs I have been taking. This may entail an observed urine specimen, which means that a nurse or other health care provider may watch me provide urine, and I will cooperate if I am asked to provide an observed specimen.  I understand that my provider will check a copy of my State Prescription Monitoring Program () Report in order to safely prescribe medications.  Pill Counts: I consent to pill counts when requested.   I may be asked to bring all my prescribed controlled substance medications, in their original bottles, to all of my scheduled appointments. In addition, my provider may ask me to come to the practice at any time for a random pill count. 8. TERMINATION OF THIS AGREEMENT  For my safety, my prescriber has the right to stop prescribing controlled substance medications and may end this agreement. Initials:_______   Conditions that may result in termination of this agreement:  a. I do not show any improvement in pain, or my activity has not improved. b. I develop rapid tolerance or loss of improvement, as described in my treatment plan.  c. I develop significant side effects from the medication. d. My behavior is not consistent with the responsibilities outlined above, thereby causing safety concerns to continue prescribing controlled substance medications. e. I fail to follow the terms of this agreement. f. Other:____________________________       UNDERSTANDING THIS MEDICATION AGREEMENT:    I have read the above and have had all my questions answered. For chronic disease management, I know that my symptoms can be managed with many types of treatments. A chronic medication trial may be part of my treatment, but I must be an active participant in my care. Medication therapy is only one part of my symptom management plan. In some cases, there may be limited scientific evidence to support the chronic use of certain medications to improve symptoms and daily function. Furthermore, in certain circumstances, there may be scientific information that suggests that the use of chronic controlled substances may worsen my symptoms and increase my risk of unintentional death directly related to this medication therapy. I know that if my provider feels my risk from controlled medications is greater than my benefit, I will have my controlled substance medication(s) compassionately lowered or removed altogether.      I further agree to allow this office to contact my HIPAA contact if there are concerns about my safety and use of the controlled medications. I have agreed to use the prescribed controlled substance medications to me as instructed by my provider and as stated in this Medication Agreement. My initial on each page and my signature below shows that I have read each page and I have had the opportunity to ask questions with answers provided by my provider.     Patient Name (Printed): _____________________________________  Patient Signature:  ______________________   Date: _____________    Prescriber Name (Printed): ___________________________________  Prescriber Signature: _____________________  Date: _____________

## 2022-06-23 NOTE — PATIENT INSTRUCTIONS
Patient Education        Attention Deficit Hyperactivity Disorder (ADHD) in Adults: Care Instructions  Your Care Instructions     Attention deficit hyperactivity disorder, or ADHD, is a condition that makes it hard to pay attention. So you may have problems when you try to focus, get organized, and finish tasks. It might make you more active than other people. Or you might do things without thinking first.  ADHD is very common. It usually starts in early childhood. Many adults don't realize they have it until their children are diagnosed. Then they become awareof their own symptoms. Doctors don't know what causes ADHD. But it often runs in families. ADHD can be treated with medicines, behavior training, and counseling. Treatment can improve your life. Follow-up care is a key part of your treatment and safety. Be sure to make and go to all appointments, and call your doctor if you are having problems. It's also a good idea to know your test results and keep alist of the medicines you take. How can you care for yourself at home?  Learn all you can about ADHD. This will help you and your family understand it better.  Take your medicines exactly as prescribed. Call your doctor if you think you are having a problem with your medicine. You will get more details on the specific medicines your doctor prescribes.  If you miss a dose of your medicine, do not take an extra dose.  If your doctor suggests counseling, find a counselor you like and trust. Talk openly and honestly. Be willing to make some changes. Dino Dia Find a support group for adults with ADHD. Talking to others with the same problems can help you feel better. It can also give you ideas about how to best cope with the condition.  Get rid of distractions at your work space. Keep your desk clean. Try not to face a window or busy hallway.  Use files, planners, and other tools to keep you organized.  Limit use of alcohol, and do not use illegal drugs. People with ADHD tend to develop substance use disorder more easily than others. Tell your doctor if you need help to quit. Counseling, support groups, and sometimes medicines can help you stay free of alcohol or drugs.  Get at least 30 minutes of physical activity on most days of the week. Exercise has been shown to help people cope with ADHD. Walking is a good choice. You also may want to do other activities, such as running, swimming, cycling, or playing tennis or team sports. When should you call for help? Watch closely for changes in your health, and be sure to contact your doctor if:     You feel sad a lot or cry all the time.      You have trouble sleeping, or you sleep too much.      You find it hard to concentrate, make decisions, or remember things.      You change how you normally eat.      You feel guilty for no reason. Where can you learn more? Go to https://[employer].farmhopping. org and sign in to your [employer] account. Enter B196 in the [employer] box to learn more about \"Attention Deficit Hyperactivity Disorder (ADHD) in Adults: Care Instructions. \"     If you do not have an account, please click on the \"Sign Up Now\" link. Current as of: February 9, 2022               Content Version: 13.3  © 2006-2022 [employer]. Care instructions adapted under license by [facility] ([facility]). If you have questions about a medical condition or this instruction, always ask your healthcare professional. [place] [address] any warranty or liability for your use of this information. Patient Education        Learning About Stimulant Medicines for Attention Deficit Hyperactivity Disorder (ADHD)  How are stimulant medicines used to treat ADHD? Stimulant medicines affect certain chemicals in the brain. They can help a person with ADHD to focus better. And they can make the person less hyperactive and impulsive.  ADHD is treated with medicines and behavior the medicine. If you want to stop or reduce your use of the medicine, talk to your doctorfirst. Some signs that you may be able to lower or stop your dose include:   You have no symptoms for more than a year while you take the medicine.  You are doing better at the same dose.  Your behavior is appropriate even if you miss a dose or two.  You are newly able to concentrate. Follow-up care is a key part of your treatment and safety. Be sure to make and go to all appointments, and call your doctor if you are having problems. It's also a good idea to know your test results and keep alist of the medicines you take. Where can you learn more? Go to https://SimpleOrder.Travolver. org and sign in to your [employer] account. Enter S155 in the [employer] box to learn more about \"Learning About Stimulant Medicines for Attention Deficit Hyperactivity Disorder (ADHD). \"     If you do not have an account, please click on the \"Sign Up Now\" link. Current as of: February 9, 2022               Content Version: 13.3  © 1658-1006 Healthwise, Incorporated. Care instructions adapted under license by [facility] ([facility]). If you have questions about a medical condition or this instruction, always ask your healthcare professional. [address] any warranty or liability for your use of this information.

## 2022-06-23 NOTE — PROGRESS NOTES
2022     Murphy Lynch (:  2000) is a 25 y.o. 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 She is struggling in her classes. Has noticed recently that she is unable to focus. Small things will distract her. She notices this she is very agitated because of the focus issues. She failed classes for nursing during this semester. Review of Systems   Constitutional: Negative. Respiratory: Negative. Cardiovascular: Negative. Psychiatric/Behavioral: Positive for decreased concentration. Negative for self-injury, sleep disturbance and suicidal ideas. The patient is not nervous/anxious. Prior to Visit Medications    Medication Sig Taking? Authorizing Provider   amphetamine-dextroamphetamine (ADDERALL, 5MG,) 5 MG tablet Take 1 tablet by mouth daily for 30 days. Yes Sherran Cranker, APRN - CNP   sulfaSALAzine (AZULFIDINE) 500 MG tablet Take 1 tablet twice a day for 2 weeks and then increase to 2 tablets twice a day Yes Agnieszka Ceballos MD      Social History     Tobacco Use    Smoking status: Never Smoker    Smokeless tobacco: Never Used   Vaping Use    Vaping Use: Some days   Substance Use Topics    Alcohol use: Never    Drug use: Never      Vitals:    22 0852   BP: 121/80   Pulse: 67   SpO2: 100%   Weight: 234 lb (106.1 kg)   Height: 5' 5\" (1.651 m)     Estimated body mass index is 38.94 kg/m² as calculated from the following:    Height as of this encounter: 5' 5\" (1.651 m). Weight as of this encounter: 234 lb (106.1 kg). Physical Exam  Vitals and nursing note reviewed. Constitutional:       General: She is not in acute distress. Appearance: Normal appearance. She is obese. She is not ill-appearing.    Cardiovascular:      Rate and Rhythm: Normal rate and regular rhythm. Heart sounds: Normal heart sounds, S1 normal and S2 normal. No murmur heard. Pulmonary:      Effort: Pulmonary effort is normal.      Breath sounds: Normal breath sounds and air entry. Skin:     General: Skin is warm and dry. Capillary Refill: Capillary refill takes less than 2 seconds. Neurological:      General: No focal deficit present. Mental Status: She is alert and oriented to person, place, and time. Psychiatric:         Mood and Affect: Mood normal.         Behavior: Behavior normal.         Thought Content: Thought content normal.         Judgment: Judgment normal.       ASSESSMENT/PLAN:  1. ADD (attention deficit disorder) without hyperactivity  Will trial for one month, may need increase in dose or XR  Controlled substance agreement signed and scanned into chart  No inconsistencies with [place]. Medication initiated after consulting with collaborating physician. Discussed requirements with being seen with controlled substances, verbalized understanding  - amphetamine-dextroamphetamine (ADDERALL, 5MG,) 5 MG tablet; Take 1 tablet by mouth daily for 30 days. Dispense: 30 tablet; Refill: 0    Return in about 4 weeks (around 7/21/2022), or if symptoms worsen or fail to improve, for ADD.

## 2022-07-19 ENCOUNTER — OFFICE VISIT (OUTPATIENT)
Dept: RHEUMATOLOGY | Age: 22
End: 2022-07-19
Payer: COMMERCIAL

## 2022-07-19 VITALS
BODY MASS INDEX: 37.61 KG/M2 | HEIGHT: 66 IN | WEIGHT: 234 LBS | SYSTOLIC BLOOD PRESSURE: 120 MMHG | DIASTOLIC BLOOD PRESSURE: 72 MMHG

## 2022-07-19 DIAGNOSIS — Z79.899 HIGH RISK MEDICATION USE: ICD-10-CM

## 2022-07-19 DIAGNOSIS — M06.00 SERONEGATIVE RHEUMATOID ARTHRITIS (HCC): ICD-10-CM

## 2022-07-19 DIAGNOSIS — R21 RASH AND NONSPECIFIC SKIN ERUPTION: ICD-10-CM

## 2022-07-19 DIAGNOSIS — R76.8 POSITIVE ANA (ANTINUCLEAR ANTIBODY): Primary | ICD-10-CM

## 2022-07-19 LAB
ALT SERPL-CCNC: 11 U/L (ref 10–40)
AST SERPL-CCNC: 15 U/L (ref 15–37)
BASOPHILS ABSOLUTE: 0 K/UL (ref 0–0.2)
BASOPHILS RELATIVE PERCENT: 0.5 %
CREAT SERPL-MCNC: 0.7 MG/DL (ref 0.6–1.1)
EOSINOPHILS ABSOLUTE: 0.1 K/UL (ref 0–0.6)
EOSINOPHILS RELATIVE PERCENT: 1.6 %
GFR AFRICAN AMERICAN: >60
GFR NON-AFRICAN AMERICAN: >60
HCT VFR BLD CALC: 40.7 % (ref 36–48)
HEMOGLOBIN: 13.5 G/DL (ref 12–16)
LYMPHOCYTES ABSOLUTE: 1.6 K/UL (ref 1–5.1)
LYMPHOCYTES RELATIVE PERCENT: 19.7 %
MCH RBC QN AUTO: 27.2 PG (ref 26–34)
MCHC RBC AUTO-ENTMCNC: 33.1 G/DL (ref 31–36)
MCV RBC AUTO: 82.2 FL (ref 80–100)
MONOCYTES ABSOLUTE: 0.7 K/UL (ref 0–1.3)
MONOCYTES RELATIVE PERCENT: 8.8 %
NEUTROPHILS ABSOLUTE: 5.7 K/UL (ref 1.7–7.7)
NEUTROPHILS RELATIVE PERCENT: 69.4 %
PDW BLD-RTO: 14.1 % (ref 12.4–15.4)
PLATELET # BLD: 243 K/UL (ref 135–450)
PMV BLD AUTO: 8.8 FL (ref 5–10.5)
RBC # BLD: 4.94 M/UL (ref 4–5.2)
WBC # BLD: 8.2 K/UL (ref 4–11)

## 2022-07-19 PROCEDURE — 99214 OFFICE O/P EST MOD 30 MIN: CPT | Performed by: INTERNAL MEDICINE

## 2022-07-19 RX ORDER — SULFASALAZINE 500 MG/1
1000 TABLET ORAL 2 TIMES DAILY
Qty: 120 TABLET | Refills: 2 | Status: SHIPPED | OUTPATIENT
Start: 2022-07-19 | End: 2022-10-24

## 2022-07-19 NOTE — PROGRESS NOTES
10/19/2022). The risks and benefits of my recommendations, as well as other treatment options, benefits and side effects werediscussed with the patient. All questions were answered. I reviewed patient's history, referral documents and electronic medical records  Copy of consult note is being routedelectronically/faxed to referring physician         MEDICATIONS  Current Outpatient Medications   Medication Sig Dispense Refill    sulfaSALAzine (AZULFIDINE) 500 MG tablet Take 2 tablets by mouth in the morning and 2 tablets before bedtime. 120 tablet 2    amphetamine-dextroamphetamine (ADDERALL, 5MG,) 5 MG tablet Take 1 tablet by mouth daily for 30 days. 30 tablet 0     No current facility-administered medications for this visit. ALLERGIES  Allergies   Allergen Reactions    Methotrexate Derivatives Rash         Comments  No specialty comments available. Background history:  Kathrin Miller is a 25 y.o. female with no significant past medical history who is being seen for follow up evaluation of  joint pain. Symptoms started last year in July 2020. She has pain in the knees associated with swelling and stiffness on daily basis. She has difficulty going up and down the steps. Pain is worse in the morning and towards the end of the day. She has morning stiffness lasting for 20 to 30 minutes. She gets occasional severe flareups associated with redness which last for few hours. She also gets on and off pain in hands, wrists 2-3 times a week lasting for few hours to 5 days associated with swelling and stiffness. She gets occasional achiness in the ankles and the shoulders. She takes ibuprofen as needed which helps. Her twin sister has rheumatoid arthritis. She denies psoriasis, inflammatory bowel disease, inflammatory back pain, dactylitis, enthesitis, tenosynovitis or uveitis. She denies fever, weight loss or swollen glands.   She denies malar rash, photosensitivity, oral/nasal ulcers, Raynaud's, alopecia, kidney disease, blood clots, miscarriages, pleurisy or pericarditis. Work-up in July 2020 showed negative NAPOLEON and normal ESR and CRP    Interim history: She presents for follow-up of seronegative rheumatoid arthritis and positive NAPOLEON. She did not tolerate methotrexate [rash] and Plaquenil [headaches]. She is on sulfasalazine 1000 mg twice a day. She reports significant improvement in joint pain, swelling and stiffness on sulfasalazine. She gets intermittent rash on her hands, knees which last for up to 30 minutes. She denies any preceding event. Blood work showed negative RF, CCP, normal ESR and CRP. Hand and knee x-rays without any erosions or degenerative changes. She has low titer positive NAPOLEON 1: 80 speckled pattern. dsDNA, anti-Walker, RNP, SSA/SSB negative. She denies malar rash, photosensitivity, oral/nasal ulcers, dry eyes/dry mouth, Raynaud's, alopecia, kidney disease, pleurisy or pericarditis. HPI  Review of Systems    REVIEW OF SYSTEMS: Positive for fatigue, headaches, rash, easy bruising  Constitutional: No unanticipated weight loss or fevers. Integumentary: No photosensitivity, malar rash, livedo reticularis, alopecia and Raynaud's symptoms, sclerodactyly, skin tightening  Eyes: negative for visual disturbance and persistent redness, discharge from eyes   ENT: - No tinnitus, loss of hearing, vertigo, or recurrent ear infections.  - No history of nasal/oral ulcers. - No history of dry eyes/dry mouth  Cardiovascular: No history of pericarditis, chest pain or murmur or palpitations  Respiratory: No shortness of breath, cough or history of interstitial lung disease. No history of pleurisy. No history of tuberculosis or atypical infections. Gastrointestinal: No history of heart burn, dysphagia or esophageal dysmotility. No change in bowel habits or any inflammatory bowel disease. Genitourinary: No history of renal disease, miscarriages.   Hematologic/Lymphatic: No or bleeding, blood clots or swollen lymph nodes. Neurological: No history of seizure or focal weakness. No history of neuropathies, paresthesias or hyperesthesias, facial droop, diplopia  Psychiatric: No history of bipolar disease, anxiety, depression  Endocrine: Denies any polyuria, polydipsia and osteoporosis  Allergic/Immunologic: No nasal congestion or hives. I have reviewed patients Past medical History, Social History and Family History as mentioned in her chart and this remains unchanged fromprevious. Past Medical History:   Diagnosis Date    Seronegative rheumatoid arthritis ([facility] [address]      No past surgical history on file. Social History     Socioeconomic History    Marital status: Single     Spouse name: Not on file    Number of children: Not on file    Years of education: Not on file    Highest education level: Not on file   Occupational History    Not on file   Tobacco Use    Smoking status: Never    Smokeless tobacco: Never   Vaping Use    Vaping Use: Some days   Substance and Sexual Activity    Alcohol use: Never    Drug use: Never    Sexual activity: Not Currently   Other Topics Concern    Not on file   Social History Narrative    Patient is currently working full time at [employer].  She states this is very stressful. She is very interested in attending nursing school in her future.      Social Determinants of Health     Financial Resource Strain: Medium Risk    Difficulty of Paying Living Expenses: Somewhat hard   Food Insecurity: No Food Insecurity    Worried About Running Out of Food in the Last Year: Never true    Ran Out of Food in the Last Year: Never true   Transportation Needs: Not on file   Physical Activity: Not on file   Stress: Not on file   Social Connections: Not on file   Intimate Partner Violence: Not on file   Housing Stability: Not on file     Family History   Problem Relation Age of Onset    Arthritis Sister          PHYSICAL EXAM   Vitals:    07/19/22 0935   BP: 120/72   Site: Right Upper Arm   Position: Sitting   Cuff Size: Large Adult   Weight: 234 lb (106.1 kg)   Height: 5' 6\" (1.676 m)     Physical Exam  Constitutional:  Well developed, well nourished, no acute distress, non-toxic appearance   Musculoskeletal:    RIGHT  Swell  Tender  ROM  LEFT  Swell  Tender  ROM    DIP2  0  0  FULL   0  0  FULL    DIP3  0  0  FULL   0  0  FULL    DIP4  0  0  FULL   0  0  FULL    DIP5  0  0  FULL   0  0  FULL    PIP1  0  0  FULL   0  0  FULL    PIP2  0  0  FULL   0  0  FULL    PIP3  0 0 FULL   0  0  FULL    PIP4  0 0 FULL   0  0  FULL    PIP5  0  0  FULL   0  0  FULL    MCP1  0  0  FULL   0  0  FULL    MCP2  0  0  FULL   0  0  FULL    MCP3  0 0 FULL   0 0 FULL    MCP4  0  0  FULL   0  0  FULL    MCP5  0  0  FULL   0  0  FULL    Wrist  0 0 FULL   0  0  FULL    Elbow  0  0  FULL   0  0  FULL    Shouldr  0  0  FULL   0  0  FULL    Hip  0  0  FULL   0  0  FULL    Knee  0 0 FULL   0 0 FULL    Ankle  0  0  FULL   0 0 FULL    MTP1  0  0  FULL   0  0  FULL    MTP2  0  0  FULL   0  0  FULL    MTP3  0  0  FULL   0  0  FULL    MTP4  0  0  FULL   0  0  FULL    MTP5  0  0  FULL   0  0  FULL    IP1  0  0  FULL   0  0  FULL    IP2  0  0  FULL   0  0  FULL    IP3  0  0  FULL   0  0  FULL    IP4  0  0  FULL   0  0  FULL    IP5  0  0  FULL   0 0 FULL      tenderness to palpation along medial and lateral joint line in bilateral knees   Ambulates without assistance, normal gait  Neck: Full ROM, no tenderness,supple   Back- no tenderness. Eyes:  PERRL, extra ocular movements intact, conjunctiva normal   HEENT:  Atraumatic, normocephalic, external ears normal, oropharynx moist, no pharyngeal exudates.    Respiratory:  No respiratory distress  GI:  Soft, nondistended, normal bowel sounds, nontender, noorganomegaly, no mass, no rebound, no guarding   :  No costovertebral angle tenderness   Integument:  Well hydrated, no rash or telangiectasias  Lymphatic:  No lymphadenopathy noted   Neurologic:   Alert & oriented x 3, CN 2-12 normal, no focal deficits noted. Sensations Intact. Muscle strength 5/5 proximally and distally in upper and lower extremities.    Psychiatric:  Speech and behavior appropriate           LABS AND IMAGING  Outside data reviewed and in HPI    Lab Results   Component Value Date/Time    WBC 11.0 02/02/2022 11:00 AM    RBC 4.83 02/02/2022 11:00 AM    HGB 13.1 02/02/2022 11:00 AM    HCT 40.3 02/02/2022 11:00 AM     02/02/2022 11:00 AM    MCV 83.4 02/02/2022 11:00 AM    MCH 27.0 02/02/2022 11:00 AM    MCHC 32.4 02/02/2022 11:00 AM    RDW 13.8 02/02/2022 11:00 AM    LYMPHOPCT 19.6 02/02/2022 11:00 AM    MONOPCT 6.4 02/02/2022 11:00 AM    BASOPCT 0.6 02/02/2022 11:00 AM    MONOSABS 0.7 02/02/2022 11:00 AM    LYMPHSABS 2.2 02/02/2022 11:00 AM    EOSABS 0.2 02/02/2022 11:00 AM    BASOSABS 0.1 02/02/2022 11:00 AM       Chemistry        Component Value Date/Time     09/22/2021 0940    K 4.6 09/22/2021 0940     09/22/2021 0940    CO2 22 09/22/2021 0940    BUN 12 09/22/2021 0940    CREATININE 0.7 02/02/2022 1100        Component Value Date/Time    CALCIUM 9.6 09/22/2021 0940    ALKPHOS 65 09/22/2021 0940    AST 19 02/02/2022 1100    ALT 18 02/02/2022 1100    BILITOT <0.2 09/22/2021 0940          Lab Results   Component Value Date    SEDRATE 16 09/22/2021     Lab Results   Component Value Date    CRP <3.0 09/22/2021     Lab Results   Component Value Date/Time    NAPOLEON Negative 07/17/2020 02:56 PM     Lab Results   Component Value Date/Time    RF <10.0 09/22/2021 09:40 AM     Lab Results   Component Value Date/Time    NAPOLEON Negative 07/17/2020 02:56 PM    ANATITER 1:80 09/22/2021 09:41 AM     No results found for: DSDNAG, DSDNAIGGIFA  No results found for: SSAROAB, SSALAAB  No results found for: SMAB, RNPAB  No results found for: CENTABIGG  No results found for: C3, C4, ACE  No results found for: JO1, VITD25, [id number]  No results found for: Kb Bradley  No results found for: [id number]  Lab Results   Component Value Date    TSHFT4 2.84 09/22/2021    TSH 4.07 07/17/2020     No results found for: VITD25    Hand and knee x-rays 9/22/2021  No evidence of degenerative arthritis or erosions    ######################################################################    I thank you for giving me theopportunity to participate in [facility]. If you have any questions or concerns please feel free to contact me. I look forward to following  Susana Santiago along with you. Electronically signed by: Marie Pop MD, MD, 7/19/2022 10:23 AM    Documentation was done using voice recognition dragon software. Every effort was made to ensure accuracy;however, inadvertent unintentional computerized transcription errors may be present.

## 2022-07-22 ENCOUNTER — OFFICE VISIT (OUTPATIENT)
Dept: FAMILY MEDICINE CLINIC | Age: 22
End: 2022-07-22
Payer: COMMERCIAL

## 2022-07-22 VITALS
DIASTOLIC BLOOD PRESSURE: 78 MMHG | WEIGHT: 238 LBS | HEIGHT: 66 IN | HEART RATE: 92 BPM | BODY MASS INDEX: 38.25 KG/M2 | SYSTOLIC BLOOD PRESSURE: 118 MMHG

## 2022-07-22 DIAGNOSIS — F98.8 ADD (ATTENTION DEFICIT DISORDER) WITHOUT HYPERACTIVITY: Primary | ICD-10-CM

## 2022-07-22 PROCEDURE — 99213 OFFICE O/P EST LOW 20 MIN: CPT | Performed by: NURSE PRACTITIONER

## 2022-07-22 RX ORDER — DEXTROAMPHETAMINE SACCHARATE, AMPHETAMINE ASPARTATE, DEXTROAMPHETAMINE SULFATE AND AMPHETAMINE SULFATE 2.5; 2.5; 2.5; 2.5 MG/1; MG/1; MG/1; MG/1
10 TABLET ORAL DAILY
Qty: 30 TABLET | Refills: 0 | Status: SHIPPED | OUTPATIENT
Start: 2022-09-21 | End: 2022-10-24

## 2022-07-22 RX ORDER — DEXTROAMPHETAMINE SACCHARATE, AMPHETAMINE ASPARTATE, DEXTROAMPHETAMINE SULFATE AND AMPHETAMINE SULFATE 2.5; 2.5; 2.5; 2.5 MG/1; MG/1; MG/1; MG/1
10 TABLET ORAL DAILY
Qty: 30 TABLET | Refills: 0 | Status: SHIPPED | OUTPATIENT
Start: 2022-08-21 | End: 2022-10-24

## 2022-07-22 RX ORDER — DEXTROAMPHETAMINE SACCHARATE, AMPHETAMINE ASPARTATE, DEXTROAMPHETAMINE SULFATE AND AMPHETAMINE SULFATE 2.5; 2.5; 2.5; 2.5 MG/1; MG/1; MG/1; MG/1
10 TABLET ORAL DAILY
Qty: 30 TABLET | Refills: 0 | Status: SHIPPED | OUTPATIENT
Start: 2022-07-22 | End: 2022-10-24

## 2022-07-22 ASSESSMENT — ENCOUNTER SYMPTOMS: RESPIRATORY NEGATIVE: 1

## 2022-07-22 NOTE — PROGRESS NOTES
2022     Mary Kay Marquez (:  2000) is a 25 y.o. female, here for evaluation of the following medical concerns:    Chief Complaint   Patient presents with    1 Month Follow-Up     ADD/ADHD:  Current treatment: Adderall- 5mg daily, which has been somewhat effective. Residual symptoms: none. Medication side effects: None. Patient denies None. She does note that focus is still an issue during some classes. Review of Systems   Constitutional: Negative. Respiratory: Negative. Cardiovascular: Negative. Psychiatric/Behavioral:  Positive for decreased concentration. Negative for self-injury, sleep disturbance and suicidal ideas. The patient is hyperactive. The patient is not nervous/anxious. Prior to Visit Medications    Medication Sig Taking? Authorizing Provider   amphetamine-dextroamphetamine (ADDERALL, 10MG,) 10 MG tablet Take 1 tablet by mouth in the morning for 30 days. Yes MONTANA Rojas CNP   amphetamine-dextroamphetamine (ADDERALL, 10MG,) 10 MG tablet Take 1 tablet by mouth in the morning for 30 days. Yes MONTANA Rojas CNP   amphetamine-dextroamphetamine (ADDERALL, 10MG,) 10 MG tablet Take 1 tablet by mouth in the morning for 30 days. Yes MONTANA Rojas CNP   sulfaSALAzine (AZULFIDINE) 500 MG tablet Take 2 tablets by mouth in the morning and 2 tablets before bedtime. Horace Munoz MD   amphetamine-dextroamphetamine (ADDERALL, 5MG,) 5 MG tablet Take 1 tablet by mouth daily for 30 days. MONTANA Rojas CNP      Social History     Tobacco Use    Smoking status: Never    Smokeless tobacco: Never   Vaping Use    Vaping Use: Some days   Substance Use Topics    Alcohol use: Never    Drug use: Never      Vitals:    22 1438   BP: 118/78   Pulse: 92   Weight: 238 lb (108 kg)   Height: 5' 6\" (1.676 m)   PF: 99 L/min     Estimated body mass index is 38.41 kg/m² as calculated from the following:    Height as of this encounter: 5' 6\" (1.676 m).     Weight as of this encounter: 238 lb (108 kg). Physical Exam  Vitals and nursing note reviewed. Constitutional:       General: She is not in acute distress. Appearance: Normal appearance. She is normal weight. She is not ill-appearing. Cardiovascular:      Rate and Rhythm: Normal rate and regular rhythm. Heart sounds: Normal heart sounds, S1 normal and S2 normal. No murmur heard. Pulmonary:      Effort: Pulmonary effort is normal.      Breath sounds: Normal breath sounds and air entry. Skin:     General: Skin is warm and dry. Capillary Refill: Capillary refill takes less than 2 seconds. Neurological:      General: No focal deficit present. Mental Status: She is alert. Psychiatric:         Mood and Affect: Mood normal.       ASSESSMENT/PLAN:  1. ADD (attention deficit disorder) without hyperactivity  Improving  Increased dose to 10mg daily  - amphetamine-dextroamphetamine (ADDERALL, 10MG,) 10 MG tablet; Take 1 tablet by mouth in the morning for 30 days. Dispense: 30 tablet; Refill: 0  - amphetamine-dextroamphetamine (ADDERALL, 10MG,) 10 MG tablet; Take 1 tablet by mouth in the morning for 30 days. Dispense: 30 tablet; Refill: 0  - amphetamine-dextroamphetamine (ADDERALL, 10MG,) 10 MG tablet; Take 1 tablet by mouth in the morning for 30 days. Dispense: 30 tablet; Refill: 0  No inconsistencies with OARRS. Medication initiated after consulting with collaborating physician. Return in about 3 months (around 10/22/2022), or if symptoms worsen or fail to improve, for adhd.

## 2022-08-16 ENCOUNTER — PATIENT MESSAGE (OUTPATIENT)
Dept: FAMILY MEDICINE CLINIC | Age: 22
End: 2022-08-16

## 2022-08-16 DIAGNOSIS — Z11.1 SCREENING FOR TUBERCULOSIS: Primary | ICD-10-CM

## 2022-08-16 DIAGNOSIS — Z11.1 SCREENING FOR TUBERCULOSIS: ICD-10-CM

## 2022-08-16 NOTE — TELEPHONE ENCOUNTER
From: Kathrin Shadow  To: Kaleb Micheline  Sent: 2022 8:24 AM EDT  Subject: Tb Test    Gurjit Schultzchristel Mancusoolegario. I have a quick question. I came to you last October to get all my vaccines and tests for my clinicals, but unfortunately my Tb test is .      Does your office do the Quantiferon Gold Test?

## 2022-08-21 LAB
QUANTI TB GOLD PLUS: NEGATIVE
QUANTI TB1 MINUS NIL: 0 IU/ML (ref 0–0.34)
QUANTI TB2 MINUS NIL: 0 IU/ML (ref 0–0.34)
QUANTIFERON MITOGEN: >10 IU/ML
QUANTIFERON NIL: 0.02 IU/ML

## 2022-10-20 ENCOUNTER — OFFICE VISIT (OUTPATIENT)
Dept: FAMILY MEDICINE CLINIC | Age: 22
End: 2022-10-20
Payer: COMMERCIAL

## 2022-10-20 VITALS
BODY MASS INDEX: 39.05 KG/M2 | OXYGEN SATURATION: 100 % | SYSTOLIC BLOOD PRESSURE: 122 MMHG | WEIGHT: 243 LBS | DIASTOLIC BLOOD PRESSURE: 80 MMHG | HEART RATE: 89 BPM | HEIGHT: 66 IN

## 2022-10-20 DIAGNOSIS — N63.24 BREAST LUMP ON LEFT SIDE AT 8 O'CLOCK POSITION: ICD-10-CM

## 2022-10-20 DIAGNOSIS — F98.8 ADD (ATTENTION DEFICIT DISORDER) WITHOUT HYPERACTIVITY: Primary | ICD-10-CM

## 2022-10-20 PROCEDURE — 99214 OFFICE O/P EST MOD 30 MIN: CPT | Performed by: NURSE PRACTITIONER

## 2022-10-20 RX ORDER — DEXTROAMPHETAMINE SACCHARATE, AMPHETAMINE ASPARTATE, DEXTROAMPHETAMINE SULFATE AND AMPHETAMINE SULFATE 2.5; 2.5; 2.5; 2.5 MG/1; MG/1; MG/1; MG/1
10 TABLET ORAL DAILY
Qty: 30 TABLET | Refills: 0 | Status: SHIPPED | OUTPATIENT
Start: 2023-01-13 | End: 2022-10-24

## 2022-10-20 RX ORDER — DEXTROAMPHETAMINE SACCHARATE, AMPHETAMINE ASPARTATE, DEXTROAMPHETAMINE SULFATE AND AMPHETAMINE SULFATE 2.5; 2.5; 2.5; 2.5 MG/1; MG/1; MG/1; MG/1
10 TABLET ORAL DAILY
Qty: 30 TABLET | Refills: 0 | Status: SHIPPED | OUTPATIENT
Start: 2022-11-13 | End: 2022-10-24

## 2022-10-20 RX ORDER — DEXTROAMPHETAMINE SACCHARATE, AMPHETAMINE ASPARTATE, DEXTROAMPHETAMINE SULFATE AND AMPHETAMINE SULFATE 2.5; 2.5; 2.5; 2.5 MG/1; MG/1; MG/1; MG/1
10 TABLET ORAL DAILY
Qty: 30 TABLET | Refills: 0 | Status: SHIPPED | OUTPATIENT
Start: 2022-12-13 | End: 2022-10-24

## 2022-10-20 ASSESSMENT — PATIENT HEALTH QUESTIONNAIRE - PHQ9
SUM OF ALL RESPONSES TO PHQ9 QUESTIONS 1 & 2: 0
1. LITTLE INTEREST OR PLEASURE IN DOING THINGS: 0
SUM OF ALL RESPONSES TO PHQ QUESTIONS 1-9: 0
2. FEELING DOWN, DEPRESSED OR HOPELESS: 0

## 2022-10-20 ASSESSMENT — ENCOUNTER SYMPTOMS
GASTROINTESTINAL NEGATIVE: 1
CHEST TIGHTNESS: 0
COUGH: 0
SHORTNESS OF BREATH: 0
WHEEZING: 0

## 2022-10-20 NOTE — PROGRESS NOTES
10/20/2022     Tristian Winston (:  2000) is a 25 y.o. female, here for evaluation of the following medical concerns:    Chief Complaint   Patient presents with    Medication Check     Establish new pcp     ADD/ADHD:  Current treatment: Adderall- 10mg daily, which has been effective. Residual symptoms: none. Medication side effects: None. Patient denies None. Stopped taking while on break. She only used two of the months worth of script because she was on school break. Breast Lump:  patient noticed a lump on the left breast around  or July this year. She discussed with her mother but did not mention at last visit. She is becoming more concerned about this and would like to have it looked at. Review of Systems   Constitutional: Negative. Negative for chills, diaphoresis, fatigue and fever. Respiratory:  Negative for cough, chest tightness, shortness of breath and wheezing. Cardiovascular:  Negative for chest pain, palpitations and leg swelling. Gastrointestinal: Negative. Genitourinary: Negative. Breast lump   Neurological: Negative. Psychiatric/Behavioral:  Negative for decreased concentration, dysphoric mood, self-injury, sleep disturbance and suicidal ideas. The patient is not nervous/anxious and is not hyperactive. Prior to Visit Medications    Medication Sig Taking? Authorizing Provider   amphetamine-dextroamphetamine (ADDERALL, 10MG,) 10 MG tablet Take 1 tablet by mouth daily for 30 days. Yes MONTANA Herman CNP   amphetamine-dextroamphetamine (ADDERALL, 10MG,) 10 MG tablet Take 1 tablet by mouth daily for 30 days. Yes MONTANA Herman - CNP   amphetamine-dextroamphetamine (ADDERALL, 10MG,) 10 MG tablet Take 1 tablet by mouth daily for 30 days. Yes MONTANA Herman - CNP   amphetamine-dextroamphetamine (ADDERALL, 10MG,) 10 MG tablet Take 1 tablet by mouth in the morning for 30 days.  Yes MONTANA Herman - CNP   amphetamine-dextroamphetamine (ADDERALL, 10MG,) 10 MG tablet Take 1 tablet by mouth in the morning for 30 days. Nylaia Market, APRN - CNP   amphetamine-dextroamphetamine (ADDERALL, 10MG,) 10 MG tablet Take 1 tablet by mouth in the morning for 30 days. Monster Albert, APRN - CNP   sulfaSALAzine (AZULFIDINE) 500 MG tablet Take 2 tablets by mouth in the morning and 2 tablets before bedtime. Naresh Bartholomew MD      Social History     Tobacco Use    Smoking status: Never    Smokeless tobacco: Never   Vaping Use    Vaping Use: Some days   Substance Use Topics    Alcohol use: Never    Drug use: Never      Vitals:    10/20/22 0802   BP: 122/80   Pulse: 89   SpO2: 100%   Weight: 243 lb (110.2 kg)   Height: 5' 6\" (1.676 m)     Estimated body mass index is 39.22 kg/m² as calculated from the following:    Height as of this encounter: 5' 6\" (1.676 m). Weight as of this encounter: 243 lb (110.2 kg). Physical Exam  Vitals and nursing note reviewed. Constitutional:       General: She is awake. She is not in acute distress. Appearance: Normal appearance. She is well-developed, well-groomed and normal weight. She is not ill-appearing. Cardiovascular:      Rate and Rhythm: Normal rate and regular rhythm. Heart sounds: Normal heart sounds, S1 normal and S2 normal. No murmur heard. Pulmonary:      Effort: Pulmonary effort is normal.      Breath sounds: Normal breath sounds and air entry. Chest:   Breasts:     Left: Mass present. No swelling, bleeding, inverted nipple, nipple discharge, skin change or tenderness. Comments: Non-mobile lump on left breast at 8 o'clock. Non-tender, no redness, warmth or edema  Skin:     General: Skin is warm and dry. Capillary Refill: Capillary refill takes less than 2 seconds. Neurological:      General: No focal deficit present. Mental Status: She is alert.    Psychiatric:         Attention and Perception: Attention and perception normal.         Mood and Affect: Mood and affect normal.         Speech: Speech normal.         Behavior: Behavior normal. Behavior is cooperative. Thought Content: Thought content normal.       ASSESSMENT/PLAN:  1. ADD (attention deficit disorder) without hyperactivity  Stable  No inconsistencies with OARRS. Medication initiated after consulting with collaborating physician. - amphetamine-dextroamphetamine (ADDERALL, 10MG,) 10 MG tablet; Take 1 tablet by mouth daily for 30 days. Dispense: 30 tablet; Refill: 0  - amphetamine-dextroamphetamine (ADDERALL, 10MG,) 10 MG tablet; Take 1 tablet by mouth daily for 30 days. Dispense: 30 tablet; Refill: 0  - amphetamine-dextroamphetamine (ADDERALL, 10MG,) 10 MG tablet; Take 1 tablet by mouth daily for 30 days. Dispense: 30 tablet; Refill: 0    2. Breast lump on left side at 8 o'clock position  Unstable  Recommend patient schedule asap. - [facility] MATILDA DIGITAL DIAGNOSTIC BILATERAL; Future  - US BREAST LIMITED LEFT; Future    Return in about 3 months (around 1/20/2023), or if symptoms worsen or fail to improve.

## 2022-10-24 ENCOUNTER — OFFICE VISIT (OUTPATIENT)
Dept: RHEUMATOLOGY | Age: 22
End: 2022-10-24
Payer: COMMERCIAL

## 2022-10-24 VITALS — WEIGHT: 242.2 LBS | DIASTOLIC BLOOD PRESSURE: 80 MMHG | BODY MASS INDEX: 39.09 KG/M2 | SYSTOLIC BLOOD PRESSURE: 122 MMHG

## 2022-10-24 DIAGNOSIS — Z79.899 HIGH RISK MEDICATION USE: ICD-10-CM

## 2022-10-24 DIAGNOSIS — R76.8 POSITIVE ANA (ANTINUCLEAR ANTIBODY): ICD-10-CM

## 2022-10-24 DIAGNOSIS — M06.00 SERONEGATIVE RHEUMATOID ARTHRITIS (HCC): Primary | ICD-10-CM

## 2022-10-24 LAB
ALT SERPL-CCNC: 13 U/L (ref 10–40)
AST SERPL-CCNC: 15 U/L (ref 15–37)
BASOPHILS ABSOLUTE: 0 K/UL (ref 0–0.2)
BASOPHILS RELATIVE PERCENT: 0.5 %
CREAT SERPL-MCNC: 0.8 MG/DL (ref 0.6–1.1)
EOSINOPHILS ABSOLUTE: 0.2 K/UL (ref 0–0.6)
EOSINOPHILS RELATIVE PERCENT: 2.5 %
GFR SERPL CREATININE-BSD FRML MDRD: >60 ML/MIN/{1.73_M2}
HCT VFR BLD CALC: 40.1 % (ref 36–48)
HEMOGLOBIN: 13.1 G/DL (ref 12–16)
LYMPHOCYTES ABSOLUTE: 1.7 K/UL (ref 1–5.1)
LYMPHOCYTES RELATIVE PERCENT: 24.1 %
MCH RBC QN AUTO: 27.4 PG (ref 26–34)
MCHC RBC AUTO-ENTMCNC: 32.7 G/DL (ref 31–36)
MCV RBC AUTO: 84 FL (ref 80–100)
MONOCYTES ABSOLUTE: 0.6 K/UL (ref 0–1.3)
MONOCYTES RELATIVE PERCENT: 8.1 %
NEUTROPHILS ABSOLUTE: 4.6 K/UL (ref 1.7–7.7)
NEUTROPHILS RELATIVE PERCENT: 64.8 %
PDW BLD-RTO: 13.6 % (ref 12.4–15.4)
PLATELET # BLD: 218 K/UL (ref 135–450)
PMV BLD AUTO: 9.1 FL (ref 5–10.5)
RBC # BLD: 4.77 M/UL (ref 4–5.2)
WBC # BLD: 7.2 K/UL (ref 4–11)

## 2022-10-24 PROCEDURE — 99214 OFFICE O/P EST MOD 30 MIN: CPT | Performed by: INTERNAL MEDICINE

## 2022-10-24 NOTE — PROGRESS NOTES
10/24/2022  Patient Name: Bria Callaway  : 2000  Medical Record: [id number]      ASSESSMENT AND PLAN    Assessment/Plan:      ASSESSMENT:    1. Seronegative rheumatoid arthritis ([facility] [address]    2. High risk medication use    3. Positive NAPOLEON (antinuclear antibody)        PLAN:     Dino Waggoner was seen today for follow-up. Diagnoses and all orders for this visit:    Seronegative rheumatoid arthritis ([facility] [address]    High risk medication use  -     Creatinine, Serum  -     CBC Auto Differential  -     AST  -     ALT    Positive NAPOLEON (antinuclear antibody)  Seronegative rheumatoid arthritis-RF, CCP negative, normal ESR and CRP. Hand and knee x-rays without any erosions or degenerative arthritis. Twin sister with rheumatoid arthritis  Most likely seronegative rheumatoid arthritis  Allergic reaction on methotrexate [rash], Plaquenil caused headache  No active synovitis on joint exam.  Continue sulfasalazine 1000 mg twice a day  Recommend flu and pneumonia vaccine. Positive NAPOLEON-positive any 1: 80 speckled pattern  dsDNA, anti-Smith, RNP, SSA/SSB negative  Implications of low titer positive NAPOLEON were discussed with patient. About 15-20% of normal healthy individuals at her age may have low titer positive NAPOLEON of unclear clinical significance. We will continue to monitor periodically    High risk medication use-labs reviewed        The patient indicates understanding of these issues and agrees with the plan. Return in about 3 months (around 2023). The risks and benefits of my recommendations, as well as other treatment options, benefits and side effects werediscussed with the patient. All questions were answered.     I reviewed patient's history, referral documents and electronic medical records  Copy of consult note is being routedelectronically/faxed to referring physician         MEDICATIONS  Current Outpatient Medications   Medication Sig Dispense Refill    amphetamine-dextroamphetamine (ADDERALL, 10MG,) 10 MG tablet Take 1 tablet by mouth in the morning for 30 days. 30 tablet 0    sulfaSALAzine (AZULFIDINE) 500 MG tablet Take 2 tablets by mouth in the morning and 2 tablets before bedtime. 120 tablet 2     No current facility-administered medications for this visit. ALLERGIES  Allergies   Allergen Reactions    Methotrexate Derivatives Rash         Comments  No specialty comments available. Background history:  Tien Diggs is a 25 y.o. female with no significant past medical history who is being seen for follow up evaluation of  joint pain. Symptoms started last year in July 2020. She has pain in the knees associated with swelling and stiffness on daily basis. She has difficulty going up and down the steps. Pain is worse in the morning and towards the end of the day. She has morning stiffness lasting for 20 to 30 minutes. She gets occasional severe flareups associated with redness which last for few hours. She also gets on and off pain in hands, wrists 2-3 times a week lasting for few hours to 5 days associated with swelling and stiffness. She gets occasional achiness in the ankles and the shoulders. She takes ibuprofen as needed which helps. Her twin sister has rheumatoid arthritis. She denies psoriasis, inflammatory bowel disease, inflammatory back pain, dactylitis, enthesitis, tenosynovitis or uveitis. She denies fever, weight loss or swollen glands. She denies malar rash, photosensitivity, oral/nasal ulcers, Raynaud's, alopecia, kidney disease, blood clots, miscarriages, pleurisy or pericarditis. Work-up in July 2020 showed negative NAPOLEON and normal ESR and CRP    Interim history: She presents for follow-up of seronegative rheumatoid arthritis and positive NAPOLEON. She did not tolerate methotrexate [rash] and Plaquenil [headaches]. She is on sulfasalazine 1000 mg twice a day. She reports significant improvement in joint pain, swelling and stiffness on sulfasalazine.   She gets occasional achiness in her joints towards end of the day which lasts for 30 minutes to 1 hour. Blood work showed negative RF, CCP, normal ESR and CRP. Hand and knee x-rays without any erosions or degenerative changes. She has low titer positive NAPOLEON 1: 80 speckled pattern. dsDNA, anti-Walker, RNP, SSA/SSB negative. She denies malar rash, photosensitivity, oral/nasal ulcers, dry eyes/dry mouth, Raynaud's, alopecia, kidney disease, pleurisy or pericarditis. She denies any side effects sulfasalazine. She denies any recent fevers or infections. HPI  Review of Systems    REVIEW OF SYSTEMS: Positive for fatigue, headaches, rash, easy bruising  Constitutional: No unanticipated weight loss or fevers. Integumentary: No photosensitivity, malar rash, livedo reticularis, alopecia and Raynaud's symptoms, sclerodactyly, skin tightening  Eyes: negative for visual disturbance and persistent redness, discharge from eyes   ENT: - No tinnitus, loss of hearing, vertigo, or recurrent ear infections.  - No history of nasal/oral ulcers. - No history of dry eyes/dry mouth  Cardiovascular: No history of pericarditis, chest pain or murmur or palpitations  Respiratory: No shortness of breath, cough or history of interstitial lung disease. No history of pleurisy. No history of tuberculosis or atypical infections. Gastrointestinal: No history of heart burn, dysphagia or esophageal dysmotility. No change in bowel habits or any inflammatory bowel disease. Genitourinary: No history of renal disease, miscarriages. Hematologic/Lymphatic: No or bleeding, blood clots or swollen lymph nodes. Neurological: No history of seizure or focal weakness. No history of neuropathies, paresthesias or hyperesthesias, facial droop, diplopia  Psychiatric: No history of bipolar disease, anxiety, depression  Endocrine: Denies any polyuria, polydipsia and osteoporosis  Allergic/Immunologic: No nasal congestion or hives.         I have reviewed patients Past medical History, Social History and Family History as mentioned in her chart and this remains unchanged fromprevious. Past Medical History:   Diagnosis Date    Seronegative rheumatoid arthritis ([address]      No past surgical history on file. Social History     Socioeconomic History    Marital status: Single     Spouse name: Not on file    Number of children: Not on file    Years of education: Not on file    Highest education level: Not on file   Occupational History    Not on file   Tobacco Use    Smoking status: Never    Smokeless tobacco: Never   Vaping Use    Vaping Use: Some days   Substance and Sexual Activity    Alcohol use: Never    Drug use: Never    Sexual activity: Not Currently   Other Topics Concern    Not on file   Social History Narrative    Patient is currently working full time at [employer].  She states this is very stressful. She is very interested in attending nursing school in her future.      Social Determinants of Health     Financial Resource Strain: Medium Risk    Difficulty of Paying Living Expenses: Somewhat hard   Food Insecurity: No Food Insecurity    Worried About Running Out of Food in the Last Year: Never true    Ran Out of Food in the Last Year: Never true   Transportation Needs: Not on file   Physical Activity: Not on file   Stress: Not on file   Social Connections: Not on file   Intimate Partner Violence: Not on file   Housing Stability: Not on file     Family History   Problem Relation Age of Onset    Arthritis Sister          PHYSICAL EXAM   Vitals:    10/24/22 0846   BP: 122/80   Site: Left Upper Arm   Position: Sitting   Cuff Size: Large Adult   Weight: 242 lb 3.2 oz (109.9 kg)     Physical Exam  Constitutional:  Well developed, well nourished, no acute distress, non-toxic appearance   Musculoskeletal:    RIGHT  Swell  Tender  ROM  LEFT  Swell  Tender  ROM    DIP2  0  0  FULL   0  0  FULL    DIP3  0  0  FULL   0  0  FULL    DIP4  0  0  FULL   0  0  FULL    DIP5  0  0  FULL   0 0  FULL    PIP1  0  0  FULL   0  0  FULL    PIP2  0  0  FULL   0  0  FULL    PIP3  0 0 FULL   0  0  FULL    PIP4  0 0 FULL   0  0  FULL    PIP5  0  0  FULL   0  0  FULL    MCP1  0  0  FULL   0  0  FULL    MCP2  0  0  FULL   0  0  FULL    MCP3  0 0 FULL   0 0 FULL    MCP4  0  0  FULL   0  0  FULL    MCP5  0  0  FULL   0  0  FULL    Wrist  0 0 FULL   0  0  FULL    Elbow  0  0  FULL   0  0  FULL    Shouldr  0  0  FULL   0  0  FULL    Hip  0  0  FULL   0  0  FULL    Knee  0 0 FULL   0 0 FULL    Ankle  0  0  FULL   0 0 FULL    MTP1  0  0  FULL   0  0  FULL    MTP2  0  0  FULL   0  0  FULL    MTP3  0  0  FULL   0  0  FULL    MTP4  0  0  FULL   0  0  FULL    MTP5  0  0  FULL   0  0  FULL    IP1  0  0  FULL   0  0  FULL    IP2  0  0  FULL   0  0  FULL    IP3  0  0  FULL   0  0  FULL    IP4  0  0  FULL   0  0  FULL    IP5  0  0  FULL   0 0 FULL      tenderness to palpation along medial and lateral joint line in bilateral knees   Ambulates without assistance, normal gait  Neck: Full ROM, no tenderness,supple   Back- no tenderness. Eyes:  PERRL, extra ocular movements intact, conjunctiva normal   HEENT:  Atraumatic, normocephalic, external ears normal, oropharynx moist, no pharyngeal exudates. Respiratory:  No respiratory distress  GI:  Soft, nondistended, normal bowel sounds, nontender, noorganomegaly, no mass, no rebound, no guarding   :  No costovertebral angle tenderness   Integument:  Well hydrated, no rash or telangiectasias  Lymphatic:  No lymphadenopathy noted   Neurologic:   Alert & oriented x 3, CN 2-12 normal, no focal deficits noted. Sensations Intact. Muscle strength 5/5 proximally and distally in upper and lower extremities.    Psychiatric:  Speech and behavior appropriate           LABS AND IMAGING  Outside data reviewed and in HPI    Lab Results   Component Value Date/Time    WBC 8.2 07/19/2022 10:16 AM    RBC 4.94 07/19/2022 10:16 AM    HGB 13.5 07/19/2022 10:16 AM    HCT 40.7 07/19/2022 10:16 AM    PLT 243 07/19/2022 10:16 AM    MCV 82.2 07/19/2022 10:16 AM    MCH 27.2 07/19/2022 10:16 AM    MCHC 33.1 07/19/2022 10:16 AM    RDW 14.1 07/19/2022 10:16 AM    LYMPHOPCT 19.7 07/19/2022 10:16 AM    MONOPCT 8.8 07/19/2022 10:16 AM    BASOPCT 0.5 07/19/2022 10:16 AM    MONOSABS 0.7 07/19/2022 10:16 AM    LYMPHSABS 1.6 07/19/2022 10:16 AM    EOSABS 0.1 07/19/2022 10:16 AM    BASOSABS 0.0 07/19/2022 10:16 AM       Chemistry        Component Value Date/Time     09/22/2021 0940    K 4.6 09/22/2021 0940     09/22/2021 0940    CO2 22 09/22/2021 0940    BUN 12 09/22/2021 0940    CREATININE 0.7 07/19/2022 1016        Component Value Date/Time    CALCIUM 9.6 09/22/2021 0940    ALKPHOS 65 09/22/2021 0940    AST 15 07/19/2022 1016    ALT 11 07/19/2022 1016    BILITOT <0.2 09/22/2021 0940          Lab Results   Component Value Date    SEDRATE 16 09/22/2021     Lab Results   Component Value Date    CRP <3.0 09/22/2021     Lab Results   Component Value Date/Time    NAPOLEON Negative 07/17/2020 02:56 PM     Lab Results   Component Value Date/Time    RF <10.0 09/22/2021 09:40 AM     Lab Results   Component Value Date/Time    NAPOLEON Negative 07/17/2020 02:56 PM    ANATITER 1:80 09/22/2021 09:41 AM     No results found for: DSDNAG, DSDNAIGGIFA  No results found for: SSAROAB, SSALAAB  No results found for: SMAB, RNPAB  No results found for: CENTABIGG  No results found for: C3, C4, ACE  No results found for: Tonia Boss, [id number]  No results found for: Bhavna Ball  No results found for: [id number]  Lab Results   Component Value Date    TSHFT4 2.84 09/22/2021    TSH 4.07 07/17/2020     No results found for: VITD25    Hand and knee x-rays 9/22/2021  No evidence of degenerative arthritis or erosions    ######################################################################    I thank you for giving me theopportunity to participate in [facility]. If you have any questions or concerns please feel free to contact me.  I look forward to following  Dino Waggoner along with you. Electronically signed by: Dc Suarez MD, MD, 10/24/2022 9:03 AM    Documentation was done using voice recognition dragon software. Every effort was made to ensure accuracy;however, inadvertent unintentional computerized transcription errors may be present.

## 2022-10-25 DIAGNOSIS — B00.1 FEVER BLISTER: Primary | ICD-10-CM

## 2022-10-25 RX ORDER — VALACYCLOVIR HYDROCHLORIDE 500 MG/1
500 TABLET, FILM COATED ORAL DAILY
Qty: 90 TABLET | Refills: 1 | Status: SHIPPED | OUTPATIENT
Start: 2022-10-25

## 2022-12-19 ENCOUNTER — HOSPITAL ENCOUNTER (OUTPATIENT)
Dept: ULTRASOUND IMAGING | Age: 22
Discharge: HOME | End: 2022-12-19
Payer: COMMERCIAL

## 2022-12-19 ENCOUNTER — HOSPITAL ENCOUNTER (OUTPATIENT)
Dept: MAMMOGRAPHY | Age: 22
Discharge: HOME | End: 2022-12-19
Payer: COMMERCIAL

## 2022-12-19 ENCOUNTER — HOSPITAL ENCOUNTER (OUTPATIENT)
Dept: ULTRASOUND IMAGING | Age: 22
Discharge: HOME | End: 2022-12-19

## 2022-12-19 DIAGNOSIS — N63.24 BREAST LUMP ON LEFT SIDE AT 8 O'CLOCK POSITION: ICD-10-CM

## 2022-12-19 PROCEDURE — 76642 ULTRASOUND BREAST LIMITED: CPT

## 2022-12-20 DIAGNOSIS — N60.09 SOLITARY CYST OF BREAST, UNSPECIFIED LATERALITY: Primary | ICD-10-CM

## 2023-01-18 ENCOUNTER — OFFICE VISIT (OUTPATIENT)
Dept: FAMILY MEDICINE CLINIC | Age: 23
End: 2023-01-18
Payer: COMMERCIAL

## 2023-01-18 VITALS
HEIGHT: 66 IN | DIASTOLIC BLOOD PRESSURE: 66 MMHG | HEART RATE: 97 BPM | WEIGHT: 236 LBS | OXYGEN SATURATION: 99 % | BODY MASS INDEX: 37.93 KG/M2 | SYSTOLIC BLOOD PRESSURE: 124 MMHG

## 2023-01-18 DIAGNOSIS — F98.8 ADD (ATTENTION DEFICIT DISORDER) WITHOUT HYPERACTIVITY: Primary | ICD-10-CM

## 2023-01-18 PROCEDURE — 99213 OFFICE O/P EST LOW 20 MIN: CPT | Performed by: NURSE PRACTITIONER

## 2023-01-18 RX ORDER — DEXTROAMPHETAMINE SACCHARATE, AMPHETAMINE ASPARTATE, DEXTROAMPHETAMINE SULFATE AND AMPHETAMINE SULFATE 2.5; 2.5; 2.5; 2.5 MG/1; MG/1; MG/1; MG/1
10 TABLET ORAL DAILY
Qty: 30 TABLET | Refills: 0 | Status: SHIPPED | OUTPATIENT
Start: 2023-03-18 | End: 2023-04-17

## 2023-01-18 RX ORDER — DEXTROAMPHETAMINE SACCHARATE, AMPHETAMINE ASPARTATE, DEXTROAMPHETAMINE SULFATE AND AMPHETAMINE SULFATE 2.5; 2.5; 2.5; 2.5 MG/1; MG/1; MG/1; MG/1
10 TABLET ORAL DAILY
Qty: 30 TABLET | Refills: 0 | Status: SHIPPED | OUTPATIENT
Start: 2023-02-18 | End: 2023-03-20

## 2023-01-18 RX ORDER — DEXTROAMPHETAMINE SACCHARATE, AMPHETAMINE ASPARTATE, DEXTROAMPHETAMINE SULFATE AND AMPHETAMINE SULFATE 2.5; 2.5; 2.5; 2.5 MG/1; MG/1; MG/1; MG/1
10 TABLET ORAL DAILY
Qty: 30 TABLET | Refills: 0 | Status: SHIPPED | OUTPATIENT
Start: 2023-01-18 | End: 2023-02-17

## 2023-01-18 ASSESSMENT — ENCOUNTER SYMPTOMS
GASTROINTESTINAL NEGATIVE: 1
SHORTNESS OF BREATH: 0
CHEST TIGHTNESS: 0
COUGH: 0

## 2023-01-18 ASSESSMENT — PATIENT HEALTH QUESTIONNAIRE - PHQ9
SUM OF ALL RESPONSES TO PHQ QUESTIONS 1-9: 0
2. FEELING DOWN, DEPRESSED OR HOPELESS: 0
SUM OF ALL RESPONSES TO PHQ QUESTIONS 1-9: 0
SUM OF ALL RESPONSES TO PHQ9 QUESTIONS 1 & 2: 0
1. LITTLE INTEREST OR PLEASURE IN DOING THINGS: 0
SUM OF ALL RESPONSES TO PHQ QUESTIONS 1-9: 0
SUM OF ALL RESPONSES TO PHQ QUESTIONS 1-9: 0

## 2023-01-18 NOTE — PROGRESS NOTES
2023     Madeleine Bowen (:  2000) is a 25 y.o. female, here for evaluation of the following medical concerns:    Chief Complaint   Patient presents with    ADHD     Follow up     ADD/ADHD:  Current treatment: Adderall- 10mg daily, which has been effective. Residual symptoms: none. Medication side effects: None. 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 Review of Systems   Constitutional:  Negative for chills, diaphoresis, fatigue and fever. Respiratory:  Negative for cough, chest tightness and shortness of breath. Cardiovascular:  Negative for chest pain and palpitations. Gastrointestinal: Negative. Genitourinary: Negative. Neurological:  Negative for dizziness, weakness and headaches. Prior to Visit Medications    Medication Sig Taking? Authorizing Provider   amphetamine-dextroamphetamine (ADDERALL, 10MG,) 10 MG tablet Take 1 tablet by mouth daily for 30 days. Max Daily Amount: 10 mg Yes Leretha David, APRN - CNP   amphetamine-dextroamphetamine (ADDERALL, 10MG,) 10 MG tablet Take 1 tablet by mouth daily for 30 days. Max Daily Amount: 10 mg Yes Leretha David, APRN - CNP   amphetamine-dextroamphetamine (ADDERALL, 10MG,) 10 MG tablet Take 1 tablet by mouth daily for 30 days. Max Daily Amount: 10 mg Yes Leretha David, APRN - CNP   valACYclovir (VALTREX) 500 MG tablet Take 1 tablet by mouth daily Yes Leretha David, APRN - CNP   amphetamine-dextroamphetamine (ADDERALL, 10MG,) 10 MG tablet Take 1 tablet by mouth in the morning for 30 days. Leretha David, APRN - CNP   sulfaSALAzine (AZULFIDINE) 500 MG tablet Take 2 tablets by mouth in the morning and 2 tablets before bedtime.   Cece Padgett MD      Social History     Tobacco Use    Smoking status: Never    Smokeless tobacco: Never   Vaping Use    Vaping Use: Some days   Substance Use Topics    Alcohol use: Never    Drug use: Never      Vitals:    23 0955   BP: 124/66   Pulse: 97   SpO2: 99%   Weight: 236 lb (107 kg)   Height: 5' 6\" (1.676 m)     Estimated body mass index is 38.09 kg/m² as calculated from the following:    Height as of this encounter: 5' 6\" (1.676 m). Weight as of this encounter: 236 lb (107 kg). Physical Exam  Vitals and nursing note reviewed. Constitutional:       General: She is awake. She is not in acute distress. Appearance: Normal appearance. She is well-developed and overweight. She is not ill-appearing. Cardiovascular:      Rate and Rhythm: Normal rate and regular rhythm. Heart sounds: Normal heart sounds, S1 normal and S2 normal. No murmur heard. Pulmonary:      Effort: Pulmonary effort is normal.      Breath sounds: Normal breath sounds and air entry. Musculoskeletal:      Right lower leg: No edema. Left lower leg: No edema. Skin:     General: Skin is warm and dry. Capillary Refill: Capillary refill takes less than 2 seconds. Neurological:      General: No focal deficit present. Mental Status: She is alert. Psychiatric:         Attention and Perception: Attention and perception normal.         Mood and Affect: Mood normal.         Speech: Speech normal.         Behavior: Behavior normal. Behavior is cooperative. Thought Content: Thought content normal.         Judgment: Judgment normal.     ASSESSMENT/PLAN:  1. ADD (attention deficit disorder) without hyperactivity  Stable  No inconsistencies with OARRS. Medication initiated after consulting with collaborating physician. - amphetamine-dextroamphetamine (ADDERALL, 10MG,) 10 MG tablet; Take 1 tablet by mouth daily for 30 days. Max Daily Amount: 10 mg  Dispense: 30 tablet; Refill: 0  - amphetamine-dextroamphetamine (ADDERALL, 10MG,) 10 MG tablet; Take 1 tablet by mouth daily for 30 days. Max Daily Amount: 10 mg  Dispense: 30 tablet; Refill: 0  - amphetamine-dextroamphetamine (ADDERALL, 10MG,) 10 MG tablet; Take 1 tablet by mouth daily for 30 days.  Max Daily Amount: 10 mg  Dispense: 30 tablet; Refill: 0    Return in about 3 months (around 4/18/2023), or if symptoms worsen or fail to improve.

## 2023-01-25 ENCOUNTER — TELEMEDICINE (OUTPATIENT)
Dept: RHEUMATOLOGY | Age: 23
End: 2023-01-25
Payer: COMMERCIAL

## 2023-01-25 DIAGNOSIS — M06.00 SERONEGATIVE RHEUMATOID ARTHRITIS (HCC): Primary | ICD-10-CM

## 2023-01-25 DIAGNOSIS — Z79.899 HIGH RISK MEDICATION USE: ICD-10-CM

## 2023-01-25 DIAGNOSIS — R76.8 POSITIVE ANA (ANTINUCLEAR ANTIBODY): ICD-10-CM

## 2023-01-25 PROCEDURE — 99214 OFFICE O/P EST MOD 30 MIN: CPT | Performed by: INTERNAL MEDICINE

## 2023-01-25 RX ORDER — SULFASALAZINE 500 MG/1
1000 TABLET ORAL 2 TIMES DAILY
Qty: 120 TABLET | Refills: 2 | Status: CANCELLED | OUTPATIENT
Start: 2023-01-25 | End: 2023-02-24

## 2023-01-25 RX ORDER — SULFASALAZINE 500 MG/1
1000 TABLET ORAL 2 TIMES DAILY
Qty: 120 TABLET | Refills: 2 | Status: SHIPPED | OUTPATIENT
Start: 2023-01-25 | End: 2023-02-24

## 2023-01-25 NOTE — PROGRESS NOTES
2023  Alfonza Gaucher, was evaluated through a synchronous (real-time) audio-video encounter. The patient (or guardian if applicable) is aware that this is a billable service, which includes applicable co-pays. This Virtual Visit was conducted with patient's (and/or legal guardian's) consent. The visit was conducted pursuant to the emergency declaration under the [address] [place] [address], [address] [place] [address] authority and the [employer] Act. Patient identification was verified, and a caregiver was present when appropriate. The patient was located at Home: [address] [place] [address]. Provider was located at Home ([place] [id number]): [place]. Total time spent for this encounter: Not billed by time    --Nicolas Campbell MD on 2023 at 10:49 AM    An electronic signature was used to authenticate this note. Patient Name: Alfonza Gaucher  : 2000  Medical Record: [id number]      ASSESSMENT AND PLAN    Assessment/Plan:      ASSESSMENT:    1. Seronegative rheumatoid arthritis ([address]    2. High risk medication use    3. Positive NAPOLEON (antinuclear antibody)        PLAN:     Alva Mckeon was seen today for follow-up. Diagnoses and all orders for this visit:    Seronegative rheumatoid arthritis ([address]  -     sulfaSALAzine (AZULFIDINE) 500 MG tablet; Take 2 tablets by mouth 2 times daily    High risk medication use    Positive NAPOLEON (antinuclear antibody)  Seronegative rheumatoid arthritis-RF, CCP negative, normal ESR and CRP. Hand and knee x-rays without any erosions or degenerative arthritis. Twin sister with rheumatoid arthritis  Most likely seronegative rheumatoid arthritis  Allergic reaction on methotrexate [rash], Plaquenil caused headache  No recent joint flareups. Continue sulfasalazine 1000 mg twice a day  Recommend flu and pneumonia vaccine.     Positive NAPOLEON-positive any 1: 80 speckled pattern  dsDNA, anti-Walker, RNP, SSA/SSB negative  Implications of low titer positive NAPOLEON were discussed with patient. About 15-20% of normal healthy individuals at her age may have low titer positive NAPOLEON of unclear clinical significance. We will continue to monitor periodically    High risk medication use-labs reviewed        The patient indicates understanding of these issues and agrees with the plan. Return in about 3 months (around 4/25/2023). The risks and benefits of my recommendations, as well as other treatment options, benefits and side effects werediscussed with the patient. All questions were answered. I reviewed patient's history, referral documents and electronic medical records  Copy of consult note is being routedelectronically/faxed to referring physician         MEDICATIONS  Current Outpatient Medications   Medication Sig Dispense Refill    sulfaSALAzine (AZULFIDINE) 500 MG tablet Take 2 tablets by mouth 2 times daily 120 tablet 2    amphetamine-dextroamphetamine (ADDERALL, 10MG,) 10 MG tablet Take 1 tablet by mouth daily for 30 days. Max Daily Amount: 10 mg 30 tablet 0     No current facility-administered medications for this visit. ALLERGIES  Allergies   Allergen Reactions    Methotrexate Derivatives Rash         Comments  No specialty comments available. Background history:  Kierra Lugo is a 25 y.o. female with no significant past medical history who is being seen for follow up evaluation of  joint pain. Symptoms started last year in July 2020. She has pain in the knees associated with swelling and stiffness on daily basis. She has difficulty going up and down the steps. Pain is worse in the morning and towards the end of the day. She has morning stiffness lasting for 20 to 30 minutes. She gets occasional severe flareups associated with redness which last for few hours.   She also gets on and off pain in hands, wrists 2-3 times a week lasting for few hours to 5 days associated with swelling and stiffness. She gets occasional achiness in the ankles and the shoulders. She takes ibuprofen as needed which helps. Her twin sister has rheumatoid arthritis. She denies psoriasis, inflammatory bowel disease, inflammatory back pain, dactylitis, enthesitis, tenosynovitis or uveitis. She denies fever, weight loss or swollen glands. She denies malar rash, photosensitivity, oral/nasal ulcers, Raynaud's, alopecia, kidney disease, blood clots, miscarriages, pleurisy or pericarditis. Work-up in July 2020 showed negative NAPOLEON and normal ESR and CRP    Interim history: She presents for follow-up of seronegative rheumatoid arthritis and positive NAPOLEON. She did not tolerate methotrexate [rash] and Plaquenil [headaches]. She is on sulfasalazine 1000 mg twice a day. She reports significant improvement in joint pain, swelling and stiffness on sulfasalazine. Blood work showed negative RF, CCP, normal ESR and CRP. Hand and knee x-rays without any erosions or degenerative changes. She has low titer positive NAPOLEON 1: 80 speckled pattern. dsDNA, anti-Walker, RNP, SSA/SSB negative. She denies malar rash, photosensitivity, oral/nasal ulcers, dry eyes/dry mouth, Raynaud's, alopecia, kidney disease, pleurisy or pericarditis. She denies any side effects sulfasalazine. She denies any recent fevers or infections. HPI  Review of Systems    REVIEW OF SYSTEMS: Positive for fatigue, headaches, rash, easy bruising  Constitutional: No unanticipated weight loss or fevers. Integumentary: No photosensitivity, malar rash, livedo reticularis, alopecia and Raynaud's symptoms, sclerodactyly, skin tightening  Eyes: negative for visual disturbance and persistent redness, discharge from eyes   ENT: - No tinnitus, loss of hearing, vertigo, or recurrent ear infections.  - No history of nasal/oral ulcers.   - No history of dry eyes/dry mouth  Cardiovascular: No history of pericarditis, chest pain or murmur or palpitations  Respiratory: No shortness of breath, cough or history of interstitial lung disease. No history of pleurisy. No history of tuberculosis or atypical infections. Gastrointestinal: No history of heart burn, dysphagia or esophageal dysmotility. No change in bowel habits or any inflammatory bowel disease. Genitourinary: No history of renal disease, miscarriages. Hematologic/Lymphatic: No or bleeding, blood clots or swollen lymph nodes. Neurological: No history of seizure or focal weakness. No history of neuropathies, paresthesias or hyperesthesias, facial droop, diplopia  Psychiatric: No history of bipolar disease, anxiety, depression  Endocrine: Denies any polyuria, polydipsia and osteoporosis  Allergic/Immunologic: No nasal congestion or hives. I have reviewed patients Past medical History, Social History and Family History as mentioned in her chart and this remains unchanged fromprevious. Past Medical History:   Diagnosis Date    Seronegative rheumatoid arthritis [address] [address] [facility]      No past surgical history on file. Social History     Socioeconomic History    Marital status: Single     Spouse name: Not on file    Number of children: Not on file    Years of education: Not on file    Highest education level: Not on file   Occupational History    Not on file   Tobacco Use    Smoking status: Never    Smokeless tobacco: Never   Vaping Use    Vaping Use: Some days   Substance and Sexual Activity    Alcohol use: Never    Drug use: Never    Sexual activity: Not Currently   Other Topics Concern    Not on file   Social History Narrative    Patient is currently working full time at [employer].  She states this is very stressful. She is very interested in attending nursing school in her future.      Social Determinants of Health     Financial Resource Strain: Medium Risk    Difficulty of Paying Living Expenses: Somewhat hard   Food Insecurity: No Food Insecurity    Worried About Running Out of Food in the Last Year: Never true    Ran Out of Food in the Last Year: Never true   Transportation Needs: Not on file   Physical Activity: Not on file   Stress: Not on file   Social Connections: Not on file   Intimate Partner Violence: Not on file   Housing Stability: Not on file     Family History   Problem Relation Age of Onset    Arthritis Sister          PHYSICAL EXAM   There were no vitals filed for this visit. PHYSICAL EXAMINATION:  [ INSTRUCTIONS:  \"[x]\" Indicates a positive item  \"[]\" Indicates a negative item  -- DELETE ALL ITEMS NOT EXAMINED]  Vital Signs: (As obtained by patient/caregiver or practitioner observation)    Blood pressure-  Heart rate-    Respiratory rate-    Temperature-  Pulse oximetry-     Constitutional: [x] Appears well-developed and well-nourished [x] No apparent distress      [] Abnormal-   Mental status  [x] Alert and awake  [x] Oriented to person/place/time [x]Able to follow commands      Eyes:  EOM    [x]  Normal  [] Abnormal-  Sclera  [x]  Normal  [] Abnormal -         Discharge [x]  None visible  [] Abnormal -    HENT:   [x] Normocephalic, atraumatic.   [] Abnormal   [x] Mouth/Throat: Mucous membranes are moist.     External Ears [x] Normal  [] Abnormal-     Neck: [x] No visualized mass     Pulmonary/Chest: [x] Respiratory effort normal.  [x] No visualized signs of difficulty breathing or respiratory distress        [] Abnormal-      Musculoskeletal:   [x] Normal gait with no signs of ataxia         [x] Normal range of motion of neck        [] Abnormal-       Neurological:        [x] No Facial Asymmetry (Cranial nerve 7 motor function) (limited exam to video visit)          [x] No gaze palsy        [] Abnormal-         Skin:        [x] No significant exanthematous lesions or discoloration noted on facial skin         [] Abnormal-            Psychiatric:       [x] Normal Affect [x] No Hallucinations        [] Abnormal-          LABS AND IMAGING  Outside data reviewed and in HPI    Lab Results   Component Value Date/Time WBC 7.2 10/24/2022 09:31 AM    RBC 4.77 10/24/2022 09:31 AM    HGB 13.1 10/24/2022 09:31 AM    HCT 40.1 10/24/2022 09:31 AM     10/24/2022 09:31 AM    MCV 84.0 10/24/2022 09:31 AM    MCH 27.4 10/24/2022 09:31 AM    MCHC 32.7 10/24/2022 09:31 AM    RDW 13.6 10/24/2022 09:31 AM    LYMPHOPCT 24.1 10/24/2022 09:31 AM    MONOPCT 8.1 10/24/2022 09:31 AM    BASOPCT 0.5 10/24/2022 09:31 AM    MONOSABS 0.6 10/24/2022 09:31 AM    LYMPHSABS 1.7 10/24/2022 09:31 AM    EOSABS 0.2 10/24/2022 09:31 AM    BASOSABS 0.0 10/24/2022 09:31 AM       Chemistry        Component Value Date/Time     09/22/2021 0940    K 4.6 09/22/2021 0940     09/22/2021 0940    CO2 22 09/22/2021 0940    BUN 12 09/22/2021 0940    CREATININE 0.8 10/24/2022 0931        Component Value Date/Time    CALCIUM 9.6 09/22/2021 0940    ALKPHOS 65 09/22/2021 0940    AST 15 10/24/2022 0931    ALT 13 10/24/2022 0931    BILITOT <0.2 09/22/2021 0940          Lab Results   Component Value Date    SEDRATE 16 09/22/2021     Lab Results   Component Value Date    CRP <3.0 09/22/2021     Lab Results   Component Value Date/Time    NAPOLEON Negative 07/17/2020 02:56 PM     Lab Results   Component Value Date/Time    RF <10.0 09/22/2021 09:40 AM     Lab Results   Component Value Date/Time    NAPOLEON Negative 07/17/2020 02:56 PM    ANATITER 1:80 09/22/2021 09:41 AM     No results found for: DSDNAG, DSDNAIGGIFA  No results found for: SSAROAB, SSALAAB  No results found for: SMAB, RNPAB  No results found for: CENTABIGG  No results found for: C3, C4, ACE  No results found for: JO1, VITD25, [id number]  No results found for: Madison Suhack  No results found for: Elmira Dillard  Lab Results   Component Value Date    TSHFT4 2.84 09/22/2021    TSH 4.07 07/17/2020     No results found for: VITD25    Hand and knee x-rays 9/22/2021  No evidence of degenerative arthritis or erosions    ######################################################################    I thank you for giving me theopportunity to participate in [facility]. If you have any questions or concerns please feel free to contact me. I look forward to following  Nick Shepherd along with you. Electronically signed by: Jay Barger MD, MD, 1/25/2023 10:48 AM    Documentation was done using voice recognition dragon software. Every effort was made to ensure accuracy;however, inadvertent unintentional computerized transcription errors may be present.

## 2023-03-06 NOTE — TELEPHONE ENCOUNTER
Daily Report scanned into EMR  Monitored by Kirsten Fletcher     Stomach discomfort, chest pain, palpitations, numbness in arm and legs. Reason for Disposition   Chest pain lasting longer than 5 minutes and occurred in last 3 days (72 hours) (Exception: feels exactly the same as previously diagnosed heartburn and has accompanying sour taste in mouth)    Additional Information   Negative: SEVERE chest pain     Not experiencing it now    Answer Assessment - Initial Assessment Questions  1. LOCATION: \"Where does it hurt? \"        In between breast and under her breast; stomach area. 2. RADIATION: \"Does the pain go anywhere else? \" (e.g., into neck, jaw, arms, back)      Back between shoulder blades. 3. ONSET: \"When did the chest pain begin? \" (Minutes, hours or days)       Sister had a stomach bug around the 4th. It started around the 4th and got kind of worse. 4. PATTERN \"Does the pain come and go, or has it been constant since it started? \"  \"Does it get worse with exertion? \"       Comes and goes; when she wakes up her heart feels like it is beating \"heavy\". When she went to work she felt it and she was moving around. 5. DURATION: \"How long does it last\" (e.g., seconds, minutes, hours)   40 minutes at a time. Takes heart burn medicine. 6. SEVERITY: \"How bad is the pain? \"  (e.g., Scale 1-10; mild, moderate, or severe)     - MILD (1-3): doesn't interfere with normal activities      - MODERATE (4-7): interferes with normal activities or awakens from sleep     - SEVERE (8-10): excruciating pain, unable to do any normal activities        8/10    7. CARDIAC RISK FACTORS: \"Do you have any history of heart problems or risk factors for heart disease? \" (e.g., angina, prior heart attack; diabetes, high blood pressure, high cholesterol, smoker, or strong family history of heart disease)      No     8. PULMONARY RISK FACTORS: \"Do you have any history of lung disease? \"  (e.g., blood clots in lung, asthma, emphysema, birth control pills)      On birth control 2 months. 9. CAUSE: \"What do you think is causing the chest pain? \"      Started zoloft, on day 4.     10. OTHER SYMPTOMS: \"Do you have any other symptoms? \" (e.g., dizziness, nausea, vomiting, sweating, fever, difficulty breathing, cough)        Stomach discomfort, fast heart rate, numbness/tingling in arms and legs. 11. PREGNANCY: \"Is there any chance you are pregnant? \" \"When was your last menstrual period? \"        No    Protocols used: CHEST PAIN-ADULT-[place]    2nd level triage completed with Marty Fofana NP; provider recommends patient be seen in office today. Patient called chest pain at [address] [place] [address])  with red flag complaint. Brief description of triage: Chest pain, abdominal discomfort, fast heart rate and tingling in arms and legs. Triage indicates for patient to 2nd level triage    Care advice provided, patient verbalizes understanding; denies any other questions or concerns; instructed to call back for any new or worsening symptoms. Writer provided warm transfer to [facility] for appointment scheduling. Attention Provider: Thank you for allowing me to participate in the care of your patient. The patient was connected to triage in response to information provided to the [facility]. Please do not respond through this encounter as the response is not directed to a shared pool.

## 2023-03-28 ENCOUNTER — OFFICE VISIT (OUTPATIENT)
Dept: DERMATOLOGY | Age: 23
End: 2023-03-28
Payer: COMMERCIAL

## 2023-03-28 DIAGNOSIS — D48.5 NEOPLASM OF UNCERTAIN BEHAVIOR OF SKIN: Primary | ICD-10-CM

## 2023-03-28 PROCEDURE — 99203 OFFICE O/P NEW LOW 30 MIN: CPT | Performed by: INTERNAL MEDICINE

## 2023-03-28 NOTE — PATIENT INSTRUCTIONS
Thank you for visiting [facility]at[facility] today!  Please follow the instructions below as we discussed in clinic:      You can schedule cyst removal surgery

## 2023-03-28 NOTE — PROGRESS NOTES
surgery. Reviewed that scars on the breast tend to stretch with healing which would be normal.  Patient says she voices understanding and desires to schedule surgery  -Denies supplements or blood thinners/NSAID use. No pacemakers    RTC excision  Return to clinic sooner for any new or changing lesions    Note is transcribed using voice recognition software. Inadvertent computerized transcription errors may be present.     Julianne Aburto MD

## 2023-03-30 ENCOUNTER — PROCEDURE VISIT (OUTPATIENT)
Dept: DERMATOLOGY | Age: 23
End: 2023-03-30

## 2023-03-30 DIAGNOSIS — D48.5 NEOPLASM OF UNCERTAIN BEHAVIOR OF SKIN: Primary | ICD-10-CM

## 2023-03-30 NOTE — PROGRESS NOTES
electrodessication. The deep layers composed of the subcutaneous fat, superficial fascia, and dermis were closed with buried vertical mattress sutures using monocryl 4-0. The epidermis was meticulously approximated with running subcuticular sutures resulting in a linear closure with little to no wound tension using prolene 4-0. A non-adherent pressure dressing was applied and wound care instructions were provided verbally and in writing. The patient left alert the operative suite in stable condition.   Pt will come back here to get stitches out in 10 days  She'll wear supportive bras to help with healing + take it easy for 2 weeks--work note written for light duty    Final Wound Length: 4cm  EBL: < 5 ml  Complications: none      Ihsan Beyer MD

## 2023-03-30 NOTE — LETTER
2023       Gay Win YOB: 2000   Maynor Carroll Date of Visit:  3/30/2023     To whom it may concern:    I am the dermatology physician caring for Mike Figueredo ( 2000). She had a surgical procedure on 3/30/2023 in our Dermatology office. She will need to be on light duty (no heavy lifting, strenuous activity, etc) for the next 2 weeks (3/30/2023-2023). Please contact my office with any further questions.       Thank you,        Ashwin Sherman MD  [facility] ([facility]) Dermatology  Phone: [phone]

## 2023-03-30 NOTE — PATIENT INSTRUCTIONS
Thank you for visiting [facility] today! Please follow the instructions below as we discussed in clinic:      You had a procedure performed today. Make a nursing visit to have your stitches removed in 10 days    Please follow the wound care instructions below for 1-2 weeks. If you have not received your final excision results within 2 weeks, please contact your physician. WOUND CARE INSTRUCTIONS  Leave your pressure bandage on for 48 hours and do not get the bandage wet. You will not need to perform any wound care until this bandage is removed. When you are ready to start wound care, wash your hands thoroughly before starting  Begin cleaning surgery site 1-2 times a day with a mild soap (i.e. Dove, Cetaphil, Cerave) and water. Do not use anything antibacterial, as this will dry out your site. Dry the area with a clean gauze, Q tip, or wash cloth  Apply a generous amount of plain vaseline with a clean Q tip where you see the sutures. Avoid using Neosporin, or any bacterial ointment as this is likely to cause an allergic reaction to the site. Cut a non-stick bandage to fit the surgery site then use paper tape to hold in place. You will continue to clean the area with mild soap and water, vaseline and a bandage twice daily for 1-2 weeks    If the surgery site is located on your arm/hand/shoulder/back, we ask that you DO NOT LIFT ANYTHING HEAVIER THAN A GALLON OF MILK FOR TWO WEEKS    If your site is on your forehead, or near your eye, you will want to use ice packs to decrease swelling of the area. Please apply ice packs every hour for 20 minutes while awake and try to sleep elevated for the next two nights. For surgical areas on your arms/legs, try to keep the area elevated above the level of your heart as much as possible. Frequent gentle rubbing of your fingers or toes in that area will prevent numbness and stiffness. For surgical areas on your head/neck, do not bend over or stoop.  Do not

## 2023-04-04 ENCOUNTER — TELEPHONE (OUTPATIENT)
Dept: DERMATOLOGY | Age: 23
End: 2023-04-04

## 2023-04-04 LAB — DERMATOLOGY PATHOLOGY REPORT: NORMAL

## 2023-04-04 NOTE — TELEPHONE ENCOUNTER
Cheryl Santiago from [employer] is calling. She is trying to verify a surgery date for this pt. Her phone number is [phone]    She will also be faxing a surgery verification form to the office.

## 2023-04-04 NOTE — TELEPHONE ENCOUNTER
Surgery verification form and FMLA paperwork along with office notes faxed to Radha Nicholas from [employer].

## 2023-05-10 ENCOUNTER — OFFICE VISIT (OUTPATIENT)
Dept: FAMILY MEDICINE CLINIC | Age: 23
End: 2023-05-10
Payer: COMMERCIAL

## 2023-05-10 VITALS
OXYGEN SATURATION: 97 % | HEART RATE: 89 BPM | RESPIRATION RATE: 16 BRPM | SYSTOLIC BLOOD PRESSURE: 124 MMHG | DIASTOLIC BLOOD PRESSURE: 60 MMHG | WEIGHT: 257 LBS | BODY MASS INDEX: 41.3 KG/M2 | HEIGHT: 66 IN

## 2023-05-10 DIAGNOSIS — F98.8 ADD (ATTENTION DEFICIT DISORDER) WITHOUT HYPERACTIVITY: Primary | ICD-10-CM

## 2023-05-10 PROCEDURE — 99213 OFFICE O/P EST LOW 20 MIN: CPT | Performed by: NURSE PRACTITIONER

## 2023-05-10 RX ORDER — DEXTROAMPHETAMINE SACCHARATE, AMPHETAMINE ASPARTATE, DEXTROAMPHETAMINE SULFATE AND AMPHETAMINE SULFATE 2.5; 2.5; 2.5; 2.5 MG/1; MG/1; MG/1; MG/1
10 TABLET ORAL DAILY
Qty: 30 TABLET | Refills: 0 | Status: SHIPPED | OUTPATIENT
Start: 2023-07-10 | End: 2023-08-09

## 2023-05-10 RX ORDER — DEXTROAMPHETAMINE SACCHARATE, AMPHETAMINE ASPARTATE, DEXTROAMPHETAMINE SULFATE AND AMPHETAMINE SULFATE 2.5; 2.5; 2.5; 2.5 MG/1; MG/1; MG/1; MG/1
10 TABLET ORAL DAILY
Qty: 30 TABLET | Refills: 0 | Status: SHIPPED | OUTPATIENT
Start: 2023-06-10 | End: 2023-07-10

## 2023-05-10 RX ORDER — DEXTROAMPHETAMINE SACCHARATE, AMPHETAMINE ASPARTATE, DEXTROAMPHETAMINE SULFATE AND AMPHETAMINE SULFATE 2.5; 2.5; 2.5; 2.5 MG/1; MG/1; MG/1; MG/1
10 TABLET ORAL DAILY
Qty: 30 TABLET | Refills: 0 | Status: SHIPPED | OUTPATIENT
Start: 2023-05-10 | End: 2023-06-09

## 2023-05-10 SDOH — ECONOMIC STABILITY: HOUSING INSECURITY
IN THE LAST 12 MONTHS, WAS THERE A TIME WHEN YOU DID NOT HAVE A STEADY PLACE TO SLEEP OR SLEPT IN A SHELTER (INCLUDING NOW)?: NO

## 2023-05-10 SDOH — ECONOMIC STABILITY: FOOD INSECURITY: WITHIN THE PAST 12 MONTHS, YOU WORRIED THAT YOUR FOOD WOULD RUN OUT BEFORE YOU GOT MONEY TO BUY MORE.: NEVER TRUE

## 2023-05-10 SDOH — ECONOMIC STABILITY: INCOME INSECURITY: HOW HARD IS IT FOR YOU TO PAY FOR THE VERY BASICS LIKE FOOD, HOUSING, MEDICAL CARE, AND HEATING?: NOT VERY HARD

## 2023-05-10 SDOH — ECONOMIC STABILITY: FOOD INSECURITY: WITHIN THE PAST 12 MONTHS, THE FOOD YOU BOUGHT JUST DIDN'T LAST AND YOU DIDN'T HAVE MONEY TO GET MORE.: NEVER TRUE

## 2023-05-10 ASSESSMENT — PATIENT HEALTH QUESTIONNAIRE - PHQ9
SUM OF ALL RESPONSES TO PHQ QUESTIONS 1-9: 0
2. FEELING DOWN, DEPRESSED OR HOPELESS: 0
SUM OF ALL RESPONSES TO PHQ QUESTIONS 1-9: 0
1. LITTLE INTEREST OR PLEASURE IN DOING THINGS: 0
SUM OF ALL RESPONSES TO PHQ9 QUESTIONS 1 & 2: 0

## 2023-05-10 ASSESSMENT — ENCOUNTER SYMPTOMS
WHEEZING: 0
SHORTNESS OF BREATH: 0
GASTROINTESTINAL NEGATIVE: 1
COUGH: 0
CHEST TIGHTNESS: 0

## 2023-05-10 NOTE — PROGRESS NOTES
5/10/2023     Luis Crawford (:  2000) is a 21 y.o. female, here for evaluation of the following medical concerns:    Chief Complaint   Patient presents with    ADD     Follow up       ADD/ADHD:  Current treatment: Adderall- 10mg daily, which has been effective. Residual symptoms: none. Medication side effects: None. She is doing well in class. Review of Systems   Constitutional:  Negative for chills, diaphoresis, fatigue and fever. Respiratory:  Negative for cough, chest tightness, shortness of breath and wheezing. Cardiovascular:  Negative for chest pain and palpitations. Gastrointestinal: Negative. Genitourinary: Negative. Neurological:  Negative for dizziness, weakness and headaches. Psychiatric/Behavioral:  Positive for decreased concentration. Negative for self-injury, sleep disturbance and suicidal ideas. The patient is not hyperactive. Prior to Visit Medications    Medication Sig Taking? Authorizing Provider   amphetamine-dextroamphetamine (ADDERALL, 10MG,) 10 MG tablet Take 1 tablet by mouth daily for 30 days. Max Daily Amount: 10 mg Yes Lucio Matter, APRN - CNP   amphetamine-dextroamphetamine (ADDERALL, 10MG,) 10 MG tablet Take 1 tablet by mouth daily for 30 days. Max Daily Amount: 10 mg Yes Lucio Matter, APRN - CNP   amphetamine-dextroamphetamine (ADDERALL, 10MG,) 10 MG tablet Take 1 tablet by mouth daily for 30 days. Max Daily Amount: 10 mg Yes Lucio Matter, APRN - CNP   sulfaSALAzine (AZULFIDINE) 500 MG tablet Take 2 tablets by mouth 2 times daily  Jose Burger MD   amphetamine-dextroamphetamine (ADDERALL, 10MG,) 10 MG tablet Take 1 tablet by mouth daily for 30 days.  Max Daily Amount: 10 mg  Lucio Matter, APRN - CNP        Social History     Tobacco Use    Smoking status: Never    Smokeless tobacco: Never   Vaping Use    Vaping Use: Some days   Substance Use Topics    Alcohol use: Never    Drug use: Never        Vitals:    05/10/23 1323   BP: 124/60   Site: Left Upper

## 2023-05-31 DIAGNOSIS — A15.9 TB (TUBERCULOSIS): Primary | ICD-10-CM

## 2023-06-07 DIAGNOSIS — A15.9 TB (TUBERCULOSIS): ICD-10-CM

## 2023-06-09 LAB
GAMMA INTERFERON BACKGROUND BLD IA-ACNC: 0.01 IU/ML
MITOGEN IGNF BCKGRD COR BLD-ACNC: 1.31 IU/ML
QUANTI TB GOLD PLUS: NEGATIVE
QUANTI TB1 MINUS NIL: 0 IU/ML (ref 0–0.34)
QUANTI TB2 MINUS NIL: 0 IU/ML (ref 0–0.34)

## 2023-08-29 ENCOUNTER — TELEMEDICINE (OUTPATIENT)
Dept: FAMILY MEDICINE CLINIC | Age: 23
End: 2023-08-29
Payer: COMMERCIAL

## 2023-08-29 DIAGNOSIS — F98.8 ADD (ATTENTION DEFICIT DISORDER) WITHOUT HYPERACTIVITY: Primary | ICD-10-CM

## 2023-08-29 PROCEDURE — 99214 OFFICE O/P EST MOD 30 MIN: CPT | Performed by: NURSE PRACTITIONER

## 2023-08-29 RX ORDER — DEXTROAMPHETAMINE SACCHARATE, AMPHETAMINE ASPARTATE, DEXTROAMPHETAMINE SULFATE AND AMPHETAMINE SULFATE 2.5; 2.5; 2.5; 2.5 MG/1; MG/1; MG/1; MG/1
10 TABLET ORAL DAILY
Qty: 30 TABLET | Refills: 0 | Status: SHIPPED | OUTPATIENT
Start: 2023-09-28 | End: 2023-10-28

## 2023-08-29 RX ORDER — DEXTROAMPHETAMINE SACCHARATE, AMPHETAMINE ASPARTATE, DEXTROAMPHETAMINE SULFATE AND AMPHETAMINE SULFATE 2.5; 2.5; 2.5; 2.5 MG/1; MG/1; MG/1; MG/1
10 TABLET ORAL DAILY
Qty: 30 TABLET | Refills: 0 | Status: SHIPPED | OUTPATIENT
Start: 2023-10-28 | End: 2023-11-27

## 2023-08-29 RX ORDER — DEXTROAMPHETAMINE SACCHARATE, AMPHETAMINE ASPARTATE, DEXTROAMPHETAMINE SULFATE AND AMPHETAMINE SULFATE 2.5; 2.5; 2.5; 2.5 MG/1; MG/1; MG/1; MG/1
10 TABLET ORAL DAILY
Qty: 30 TABLET | Refills: 0 | Status: SHIPPED | OUTPATIENT
Start: 2023-08-29 | End: 2023-09-28

## 2023-08-29 ASSESSMENT — PATIENT HEALTH QUESTIONNAIRE - PHQ9
1. LITTLE INTEREST OR PLEASURE IN DOING THINGS: 0
SUM OF ALL RESPONSES TO PHQ QUESTIONS 1-9: 0
2. FEELING DOWN, DEPRESSED OR HOPELESS: 0
SUM OF ALL RESPONSES TO PHQ9 QUESTIONS 1 & 2: 0

## 2023-08-29 ASSESSMENT — ENCOUNTER SYMPTOMS
GASTROINTESTINAL NEGATIVE: 1
SHORTNESS OF BREATH: 0
COUGH: 0
CHEST TIGHTNESS: 0
WHEEZING: 0

## 2023-08-29 NOTE — PROGRESS NOTES
2023    TELEHEALTH EVALUATION -- Audio/Visual (During ZUR-15 public health emergency)    Debbie Loja (:  2000) has requested an audio/video evaluation for the following concern(s):    ADD/ADHD:  Current treatment: Adderall- 10mg daily each morning, which has been effective. Residual symptoms: none. Medication side effects: None. Patient denies None. 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 Review of Systems   Constitutional:  Negative for chills, diaphoresis, fatigue and fever. Respiratory:  Negative for cough, chest tightness, shortness of breath and wheezing. Cardiovascular:  Negative for chest pain and palpitations. Gastrointestinal: Negative. Genitourinary: Negative. Neurological:  Negative for dizziness, weakness and headaches. Psychiatric/Behavioral:  Negative for agitation, behavioral problems, confusion, decreased concentration, dysphoric mood, self-injury, sleep disturbance and suicidal ideas. The patient is not nervous/anxious and is not hyperactive. Prior to Visit Medications    Medication Sig Taking? Authorizing Provider   amphetamine-dextroamphetamine (ADDERALL) 10 MG tablet Take 1 tablet by mouth daily for 30 days. Max Daily Amount: 10 mg Yes Caty Ballesteros APRN - CNP   amphetamine-dextroamphetamine (ADDERALL) 10 MG tablet Take 1 tablet by mouth daily for 30 days. Max Daily Amount: 10 mg Yes Ctay Lesli, APRN - CNP   amphetamine-dextroamphetamine (ADDERALL) 10 MG tablet Take 1 tablet by mouth daily for 30 days. Max Daily Amount: 10 mg Yes Caty Lesli APRN - CNP   sulfaSALAzine (AZULFIDINE) 500 MG tablet Take 2 tablets by mouth 2 times daily  Renita Guy MD     Past Medical History:   Diagnosis Date    Seronegative rheumatoid arthritis ([address])      No past surgical history on file.   Family History   Problem Relation Age of Onset    Arthritis Sister      Allergies   Allergen Reactions    Methotrexate Derivatives Rash     Social History     Tobacco Use    Smoking

## 2024-12-18 ENCOUNTER — OFFICE VISIT (OUTPATIENT)
Age: 24
End: 2024-12-18

## 2024-12-18 VITALS
TEMPERATURE: 98.4 F | WEIGHT: 274.4 LBS | OXYGEN SATURATION: 98 % | DIASTOLIC BLOOD PRESSURE: 76 MMHG | HEART RATE: 120 BPM | RESPIRATION RATE: 14 BRPM | BODY MASS INDEX: 44.29 KG/M2 | SYSTOLIC BLOOD PRESSURE: 128 MMHG

## 2024-12-18 DIAGNOSIS — B34.2 CORONAVIRUS INFECTION: ICD-10-CM

## 2024-12-18 DIAGNOSIS — R05.1 ACUTE COUGH: Primary | ICD-10-CM

## 2024-12-18 LAB
Lab: ABNORMAL
PERFORMING INSTRUMENT: ABNORMAL
QC PASS/FAIL: ABNORMAL
SARS-COV-2, POC: DETECTED

## 2024-12-18 ASSESSMENT — ENCOUNTER SYMPTOMS
SORE THROAT: 1
SHORTNESS OF BREATH: 0
RHINORRHEA: 0
HEMOPTYSIS: 0
HEARTBURN: 0
COUGH: 1
WHEEZING: 0

## 2024-12-18 NOTE — PROGRESS NOTES
Franky Hernandez (:  2000) is a 24 y.o. female,New patient, here for evaluation of the following chief complaint(s):  Cough (Patient complains of a cough, sore throat, headache, cold chills, and body aches x 2 days, got worse today.  Patient is 27 weeks pregnant.  )      ASSESSMENT/PLAN:  1. Acute cough      2. Coronavirus infection    -instruction on covid 19 was d/w pt,advised to quarantine at home by the CDC guidelines,increase fluid intake,take Tylenol as needed.pt was advised to inform her OB that she tested positive for covid.         Return if symptoms worsen or fail to improve.    SUBJECTIVE/OBJECTIVE:    History provided by:  Patient  Cough  This is a new problem. The current episode started yesterday. The problem has been gradually worsening. The cough is Non-productive. Associated symptoms include chills, headaches, myalgias, nasal congestion and a sore throat. Pertinent negatives include no chest pain, ear pain, fever, heartburn, hemoptysis, rash, rhinorrhea, shortness of breath, sweats or wheezing.       Vitals:    24 1237   BP: 134/85   Site: Right Upper Arm   Position: Sitting   Cuff Size: Large Adult   Pulse: (!) 120   Temp: 98.4 °F (36.9 °C)   TempSrc: Oral   SpO2: 98%   Weight: 124.5 kg (274 lb 6.4 oz)       Review of Systems   Constitutional:  Positive for chills. Negative for fever.   HENT:  Positive for sore throat. Negative for ear pain and rhinorrhea.    Respiratory:  Positive for cough. Negative for hemoptysis, shortness of breath and wheezing.    Cardiovascular:  Negative for chest pain.   Gastrointestinal:  Negative for heartburn.   Musculoskeletal:  Positive for myalgias.   Skin:  Negative for rash.   Neurological:  Positive for headaches.       Physical Exam  Constitutional:       General: She is not in acute distress.  HENT:      Nose: Congestion present.      Mouth/Throat:      Mouth: Mucous membranes are moist.      Pharynx: No oropharyngeal exudate or posterior

## 2025-03-11 ENCOUNTER — TELEPHONE (OUTPATIENT)
Dept: FAMILY MEDICINE CLINIC | Age: 25
End: 2025-03-11

## 2025-03-11 NOTE — TELEPHONE ENCOUNTER
[facility]   Obstetrical Discharge Summary     Scanned into media 03/11/2025

## 2025-03-18 ENCOUNTER — TELEPHONE (OUTPATIENT)
Dept: FAMILY MEDICINE CLINIC | Age: 25
End: 2025-03-18

## 2025-03-28 ENCOUNTER — TELEPHONE (OUTPATIENT)
Dept: FAMILY MEDICINE CLINIC | Age: 25
End: 2025-03-28

## 2025-04-21 SDOH — ECONOMIC STABILITY: FOOD INSECURITY: WITHIN THE PAST 12 MONTHS, THE FOOD YOU BOUGHT JUST DIDN'T LAST AND YOU DIDN'T HAVE MONEY TO GET MORE.: NEVER TRUE

## 2025-04-21 SDOH — ECONOMIC STABILITY: TRANSPORTATION INSECURITY
IN THE PAST 12 MONTHS, HAS LACK OF TRANSPORTATION KEPT YOU FROM MEETINGS, WORK, OR FROM GETTING THINGS NEEDED FOR DAILY LIVING?: NO

## 2025-04-21 SDOH — ECONOMIC STABILITY: FOOD INSECURITY: WITHIN THE PAST 12 MONTHS, YOU WORRIED THAT YOUR FOOD WOULD RUN OUT BEFORE YOU GOT MONEY TO BUY MORE.: NEVER TRUE

## 2025-04-21 SDOH — ECONOMIC STABILITY: INCOME INSECURITY: IN THE LAST 12 MONTHS, WAS THERE A TIME WHEN YOU WERE NOT ABLE TO PAY THE MORTGAGE OR RENT ON TIME?: NO

## 2025-04-21 SDOH — ECONOMIC STABILITY: TRANSPORTATION INSECURITY
IN THE PAST 12 MONTHS, HAS THE LACK OF TRANSPORTATION KEPT YOU FROM MEDICAL APPOINTMENTS OR FROM GETTING MEDICATIONS?: NO

## 2025-04-21 ASSESSMENT — PATIENT HEALTH QUESTIONNAIRE - PHQ9
SUM OF ALL RESPONSES TO PHQ QUESTIONS 1-9: 0
2. FEELING DOWN, DEPRESSED OR HOPELESS: NOT AT ALL
SUM OF ALL RESPONSES TO PHQ QUESTIONS 1-9: 0
SUM OF ALL RESPONSES TO PHQ9 QUESTIONS 1 & 2: 0
SUM OF ALL RESPONSES TO PHQ QUESTIONS 1-9: 0
1. LITTLE INTEREST OR PLEASURE IN DOING THINGS: NOT AT ALL
1. LITTLE INTEREST OR PLEASURE IN DOING THINGS: NOT AT ALL
2. FEELING DOWN, DEPRESSED OR HOPELESS: NOT AT ALL
SUM OF ALL RESPONSES TO PHQ QUESTIONS 1-9: 0

## 2025-04-22 ENCOUNTER — HOSPITAL ENCOUNTER (OUTPATIENT)
Age: 25
Discharge: HOME | End: 2025-04-22
Payer: COMMERCIAL

## 2025-04-22 ENCOUNTER — OFFICE VISIT (OUTPATIENT)
Dept: FAMILY MEDICINE CLINIC | Age: 25
End: 2025-04-22

## 2025-04-22 LAB
ALBUMIN SERPL-MCNC: 4.1 G/DL (ref 3.4–5)
ALBUMIN/GLOB SERPL: 1.4 {RATIO} (ref 1.1–2.2)
ALP SERPL-CCNC: 141 U/L (ref 40–129)
ALT SERPL-CCNC: 21 U/L (ref 10–40)
ANION GAP SERPL CALCULATED.3IONS-SCNC: 13 MMOL/L (ref 3–16)
AST SERPL-CCNC: 22 U/L (ref 15–37)
BASOPHILS # BLD: 0.1 K/UL (ref 0–0.2)
BASOPHILS NFR BLD: 0.9 %
BILIRUB SERPL-MCNC: <0.2 MG/DL (ref 0–1)
BILIRUBIN, POC: NORMAL
BLOOD URINE, POC: NORMAL
BUN SERPL-MCNC: 13 MG/DL (ref 7–20)
CALCIUM SERPL-MCNC: 9.3 MG/DL (ref 8.3–10.6)
CHLORIDE SERPL-SCNC: 105 MMOL/L (ref 99–110)
CLARITY, POC: NORMAL
CO2 SERPL-SCNC: 22 MMOL/L (ref 21–32)
COLOR, POC: YELLOW
CREAT SERPL-MCNC: 0.9 MG/DL (ref 0.6–1.1)
DEPRECATED RDW RBC AUTO: 17.7 % (ref 12.4–15.4)
EOSINOPHIL # BLD: 0.2 K/UL (ref 0–0.6)
EOSINOPHIL NFR BLD: 1.4 %
GFR SERPLBLD CREATININE-BSD FMLA CKD-EPI: >90 ML/MIN/{1.73_M2}
GLUCOSE SERPL-MCNC: 88 MG/DL (ref 70–99)
GLUCOSE URINE, POC: NORMAL MG/DL
HCT VFR BLD AUTO: 34.4 % (ref 36–48)
HGB BLD-MCNC: 11.1 G/DL (ref 12–16)
KETONES, POC: NORMAL MG/DL
LEUKOCYTE EST, POC: NORMAL
LYMPHOCYTES # BLD: 2.3 K/UL (ref 1–5.1)
LYMPHOCYTES NFR BLD: 21.8 %
MCH RBC QN AUTO: 23.9 PG (ref 26–34)
MCHC RBC AUTO-ENTMCNC: 32.2 G/DL (ref 31–36)
MCV RBC AUTO: 74.2 FL (ref 80–100)
MONOCYTES # BLD: 0.6 K/UL (ref 0–1.3)
MONOCYTES NFR BLD: 5.9 %
NEUTROPHILS # BLD: 7.4 K/UL (ref 1.7–7.7)
NEUTROPHILS NFR BLD: 70 %
NITRITE, POC: NORMAL
PH, POC: 5.5
PLATELET # BLD AUTO: 289 K/UL (ref 135–450)
PMV BLD AUTO: 10.2 FL (ref 5–10.5)
POTASSIUM SERPL-SCNC: 4 MMOL/L (ref 3.5–5.1)
PROT SERPL-MCNC: 7 G/DL (ref 6.4–8.2)
PROTEIN, POC: NORMAL MG/DL
RBC # BLD AUTO: 4.63 M/UL (ref 4–5.2)
SODIUM SERPL-SCNC: 140 MMOL/L (ref 136–145)
SPECIFIC GRAVITY, POC: <=1.005
UROBILINOGEN, POC: 0.2 MG/DL
WBC # BLD AUTO: 10.6 K/UL (ref 4–11)

## 2025-04-22 PROCEDURE — 80053 COMPREHEN METABOLIC PANEL: CPT

## 2025-04-22 PROCEDURE — 36415 COLL VENOUS BLD VENIPUNCTURE: CPT

## 2025-04-22 PROCEDURE — 85025 COMPLETE CBC W/AUTO DIFF WBC: CPT

## 2025-04-22 RX ORDER — NIFEDIPINE 30 MG/1
30 TABLET, EXTENDED RELEASE ORAL DAILY
COMMUNITY
Start: 2025-03-21 | End: 2025-04-22

## 2025-04-22 RX ORDER — FUROSEMIDE 20 MG/1
20 TABLET ORAL DAILY
Qty: 5 TABLET | Refills: 0 | Status: SHIPPED | OUTPATIENT
Start: 2025-04-22 | End: 2025-04-27

## 2025-04-22 RX ORDER — NIFEDIPINE 30 MG/1
30 TABLET, EXTENDED RELEASE ORAL DAILY
Qty: 90 TABLET | Refills: 1 | Status: SHIPPED | OUTPATIENT
Start: 2025-04-22 | End: 2025-04-25

## 2025-04-22 ASSESSMENT — ENCOUNTER SYMPTOMS
CHEST TIGHTNESS: 0
WHEEZING: 0
COUGH: 0
SHORTNESS OF BREATH: 0
GASTROINTESTINAL NEGATIVE: 1

## 2025-04-22 NOTE — PATIENT INSTRUCTIONS
Detail Level: Detailed See me again on Friday   X Size Of Lesion In Cm (Optional): 0 Name Of The Referring Provider For Procedure: Aileen Sutton, PA Incorporate Mauc In Note: No

## 2025-04-22 NOTE — PROGRESS NOTES
2025     Franky Hernandez (:  2000) is a 25 y.o. female, here for evaluation of the following medical concerns:    Chief Complaint   Patient presents with    Hypertension     Swollen hands and feet      Delivered baby six week ago.  Had pre-eclampsia six weeks prior to delivery.  Pressures are running in the 140's systolic.  She was delivered at 38 weeks due to eclampsia.  She was taking Nifedipine but ran out two days ago.  Saw her OB earlier today and was told to be seen at her PCP office, BP was 148/94.  She is having swelling in bilateral hands and feet, denies shortness of breath, chest pain, palpitations or dizziness.     Review of Systems   Constitutional:  Positive for fatigue. Negative for chills, diaphoresis and fever.   Respiratory:  Negative for cough, chest tightness, shortness of breath and wheezing.    Cardiovascular:  Positive for leg swelling. Negative for chest pain and palpitations.   Gastrointestinal: Negative.    Genitourinary: Negative.    Neurological:  Positive for headaches. Negative for dizziness and weakness.   Hematological:  Does not bruise/bleed easily.     Prior to Visit Medications    Medication Sig Taking? Authorizing Provider   NIFEdipine (PROCARDIA XL) 30 MG extended release tablet Take 1 tablet by mouth daily Yes Bella Walker APRN - CNP   furosemide (LASIX) 20 MG tablet Take 1 tablet by mouth daily for 5 days Yes Bella Walker APRN - CNP      Social History     Tobacco Use    Smoking status: Never    Smokeless tobacco: Never   Vaping Use    Vaping status: Former   Substance Use Topics    Alcohol use: Never    Drug use: Never      Vitals:    25 1511 25 1541   BP: 136/86 (!) 150/100   BP Site:  Right Upper Arm   Patient Position:  Sitting   BP Cuff Size:  Large Adult   Pulse: 84    SpO2: 98%    Weight: 123.6 kg (272 lb 6.4 oz)    Height: 1.676 m (5' 6\")      Estimated body mass index is 43.97 kg/m² as calculated from the following:    Height as of this

## 2025-04-23 ENCOUNTER — RESULTS FOLLOW-UP (OUTPATIENT)
Dept: FAMILY MEDICINE CLINIC | Age: 25
End: 2025-04-23

## 2025-04-23 ENCOUNTER — TELEPHONE (OUTPATIENT)
Dept: FAMILY MEDICINE CLINIC | Age: 25
End: 2025-04-23

## 2025-04-23 VITALS
BODY MASS INDEX: 43.78 KG/M2 | HEIGHT: 66 IN | OXYGEN SATURATION: 98 % | SYSTOLIC BLOOD PRESSURE: 150 MMHG | DIASTOLIC BLOOD PRESSURE: 100 MMHG | HEART RATE: 84 BPM | WEIGHT: 272.4 LBS

## 2025-04-23 LAB — BACTERIA UR CULT: NORMAL

## 2025-04-23 NOTE — TELEPHONE ENCOUNTER
Bella, can you help me with this one and let me know what code I can use.    See the attachment - the first warning is till open for Obstetrical Diagnosis code range.

## 2025-04-25 ENCOUNTER — TELEPHONE (OUTPATIENT)
Dept: FAMILY MEDICINE CLINIC | Age: 25
End: 2025-04-25

## 2025-04-25 ENCOUNTER — OFFICE VISIT (OUTPATIENT)
Dept: FAMILY MEDICINE CLINIC | Age: 25
End: 2025-04-25

## 2025-04-25 VITALS
BODY MASS INDEX: 42.75 KG/M2 | RESPIRATION RATE: 14 BRPM | DIASTOLIC BLOOD PRESSURE: 72 MMHG | WEIGHT: 266 LBS | HEART RATE: 99 BPM | OXYGEN SATURATION: 98 % | SYSTOLIC BLOOD PRESSURE: 138 MMHG | HEIGHT: 66 IN

## 2025-04-25 DIAGNOSIS — M06.00 SERONEGATIVE RHEUMATOID ARTHRITIS (HCC): ICD-10-CM

## 2025-04-25 RX ORDER — LABETALOL 100 MG/1
TABLET, FILM COATED ORAL
Qty: 60 TABLET | Refills: 3 | Status: SHIPPED | OUTPATIENT
Start: 2025-04-25

## 2025-04-25 SDOH — ECONOMIC STABILITY: FOOD INSECURITY: WITHIN THE PAST 12 MONTHS, YOU WORRIED THAT YOUR FOOD WOULD RUN OUT BEFORE YOU GOT MONEY TO BUY MORE.: NEVER TRUE

## 2025-04-25 SDOH — ECONOMIC STABILITY: FOOD INSECURITY: WITHIN THE PAST 12 MONTHS, THE FOOD YOU BOUGHT JUST DIDN'T LAST AND YOU DIDN'T HAVE MONEY TO GET MORE.: NEVER TRUE

## 2025-04-25 ASSESSMENT — ENCOUNTER SYMPTOMS
WHEEZING: 0
CHEST TIGHTNESS: 0
COUGH: 0
SHORTNESS OF BREATH: 0
GASTROINTESTINAL NEGATIVE: 1

## 2025-04-25 ASSESSMENT — PATIENT HEALTH QUESTIONNAIRE - PHQ9
SUM OF ALL RESPONSES TO PHQ QUESTIONS 1-9: 0
1. LITTLE INTEREST OR PLEASURE IN DOING THINGS: NOT AT ALL
2. FEELING DOWN, DEPRESSED OR HOPELESS: NOT AT ALL

## 2025-04-25 NOTE — PROGRESS NOTES
2025     Franky Hernandez (:  2000) is a 25 y.o. female, here for evaluation of the following medical concerns:    Chief Complaint   Patient presents with    Hypertension     Patient arrived today for reevaluation of high blood pressure.  She had a baby about 6 weeks ago has had increased swelling some shortness of breath and her blood pressure has been elevated for her.  She was started restarted on nifedipine.  Patient states she has had good output with taking Lasix for the past 3 days and is already feeling a little bit better  Wt Readings from Last 3 Encounters:   25 120.7 kg (266 lb)   25 123.6 kg (272 lb 6.4 oz)   24 124.5 kg (274 lb 6.4 oz)     Review of Systems   Constitutional:  Negative for chills, diaphoresis, fatigue and fever.   Respiratory:  Negative for cough, chest tightness, shortness of breath and wheezing.    Cardiovascular:  Negative for chest pain and palpitations.   Gastrointestinal: Negative.    Genitourinary: Negative.    Neurological:  Negative for dizziness, weakness and headaches.       Prior to Visit Medications    Medication Sig Taking? Authorizing Provider   labetalol (NORMODYNE) 100 MG tablet Take 1/2 tablet two times daily Yes Bella Walker APRN - CNP   furosemide (LASIX) 20 MG tablet Take 1 tablet by mouth daily for 5 days Yes Bella Walker APRN - CNP      Social History     Tobacco Use    Smoking status: Never    Smokeless tobacco: Never   Vaping Use    Vaping status: Former   Substance Use Topics    Alcohol use: Never    Drug use: Never      Vitals:    25 1108 25 1121   BP: 136/74 138/72   BP Site: Left Upper Arm Left Upper Arm   Patient Position: Sitting Sitting   BP Cuff Size: Large Adult Large Adult   Pulse: 99    Resp: 14    SpO2: 98%    Weight: 120.7 kg (266 lb)    Height: 1.676 m (5' 6\")      Estimated body mass index is 42.93 kg/m² as calculated from the following:    Height as of this encounter: 1.676 m (5' 6\").    Weight as of

## 2025-04-25 NOTE — TELEPHONE ENCOUNTER
Called patient and left message to have patient call me back.  Need to talk to patient about medication she just started.  If I'm in a room come get me to talk to her or send me a message in epic so I know there is a call

## 2025-04-28 NOTE — TELEPHONE ENCOUNTER
Patient returned call.  Reviewed options with patient on safety.  Will change nifedipine to labetalol at this time information reviewed through up-to-date.  Close encounter

## 2025-06-23 ENCOUNTER — TELEPHONE (OUTPATIENT)
Dept: FAMILY MEDICINE CLINIC | Age: 25
End: 2025-06-23

## 2025-07-10 ENCOUNTER — OFFICE VISIT (OUTPATIENT)
Dept: FAMILY MEDICINE CLINIC | Age: 25
End: 2025-07-10

## 2025-07-10 VITALS
BODY MASS INDEX: 43.55 KG/M2 | HEIGHT: 66 IN | OXYGEN SATURATION: 98 % | SYSTOLIC BLOOD PRESSURE: 138 MMHG | HEART RATE: 82 BPM | DIASTOLIC BLOOD PRESSURE: 86 MMHG | WEIGHT: 271 LBS | RESPIRATION RATE: 14 BRPM

## 2025-07-10 DIAGNOSIS — F41.9 ANXIETY: Primary | ICD-10-CM

## 2025-07-10 DIAGNOSIS — R20.0 NUMBNESS AND TINGLING: ICD-10-CM

## 2025-07-10 DIAGNOSIS — R45.82 FEELING WORRIED: ICD-10-CM

## 2025-07-10 DIAGNOSIS — R20.2 NUMBNESS AND TINGLING: ICD-10-CM

## 2025-07-10 RX ORDER — SULFASALAZINE 500 MG/1
1000 TABLET ORAL 2 TIMES DAILY
COMMUNITY
Start: 2025-07-05

## 2025-07-10 ASSESSMENT — ENCOUNTER SYMPTOMS
VOMITING: 0
ABDOMINAL PAIN: 0
EYE REDNESS: 0
NAUSEA: 0
EYES NEGATIVE: 1
GASTROINTESTINAL NEGATIVE: 1
ABDOMINAL DISTENTION: 0
ALLERGIC/IMMUNOLOGIC NEGATIVE: 1
SHORTNESS OF BREATH: 0
SORE THROAT: 0
DIARRHEA: 0
RESPIRATORY NEGATIVE: 1
EYE PAIN: 0
WHEEZING: 0
CONSTIPATION: 0
PHOTOPHOBIA: 0
EYE ITCHING: 0

## 2025-07-10 ASSESSMENT — PATIENT HEALTH QUESTIONNAIRE - PHQ9
SUM OF ALL RESPONSES TO PHQ QUESTIONS 1-9: 0
2. FEELING DOWN, DEPRESSED OR HOPELESS: NOT AT ALL
SUM OF ALL RESPONSES TO PHQ QUESTIONS 1-9: 0
1. LITTLE INTEREST OR PLEASURE IN DOING THINGS: NOT AT ALL
SUM OF ALL RESPONSES TO PHQ QUESTIONS 1-9: 0
SUM OF ALL RESPONSES TO PHQ QUESTIONS 1-9: 0

## 2025-07-10 NOTE — PROGRESS NOTES
7/10/2025     Franky Hernandez (:  2000) is a 25 y.o. female, here for evaluation of the following medical concerns:    Chief Complaint   Patient presents with    Hypertension     Follow up     Anxiety     Anxiety going back to work and leaving the baby     Numbness     Numbness and tingling in right shoulder and now moving over into left shoulder as well      Hypertension: Taking labetalol as prescribed. She denies dizziness, light-headedness, nausea, vomiting, diarrhea.    Numbness: Numbness on posterior bilateral shoulders. This started 1 week ago. This initially was on the right side. This has improved overall, but has intermittent numbness/tingling across her bilateral posterior shoulders. She denies pain.    Anxiety: Endorses anxiety related to being a parent and going back to work. She feels she had some anxiety at baseline prior to pregnancy, but that this has become worse over the last 4 months. She had an episode 1 week ago where her anxiety was severe and lasted 30 minutes. Other than that episode she has been able to perform ADLs with the anxiety.    Review of Systems   Constitutional: Negative.  Negative for chills, fatigue and fever.   HENT: Negative.  Negative for ear discharge, ear pain and sore throat.    Eyes: Negative.  Negative for photophobia, pain, redness and itching.   Respiratory: Negative.  Negative for shortness of breath and wheezing.    Cardiovascular: Negative.  Negative for chest pain and palpitations.   Gastrointestinal: Negative.  Negative for abdominal distention, abdominal pain, constipation, diarrhea, nausea and vomiting.   Endocrine: Negative.  Negative for cold intolerance and heat intolerance.   Genitourinary: Negative.  Negative for dysuria, frequency and urgency.   Musculoskeletal: Negative.  Negative for arthralgias and myalgias.   Skin: Negative.  Negative for rash.   Allergic/Immunologic: Negative.  Negative for environmental allergies and food allergies.

## 2025-07-15 ENCOUNTER — TELEPHONE (OUTPATIENT)
Dept: FAMILY MEDICINE CLINIC | Age: 25
End: 2025-07-15

## 2025-07-15 NOTE — TELEPHONE ENCOUNTER
Bella, can you assist me.     O16.5 is the pregnancy code used for the visit.    Rule 688869  whenever obstetrical diagnosis code range: O09.0 - O9A.53 is found listed, need to add an additional diagnosis from code range: Z3A.0 - Z3A.49, must be listed to show number of weeks of gestation    Also can you provide me the LMP.    \"When pregnancy DX code is used, LMP must be filled out in Claim info record\".     Thank you.

## 2025-08-06 ENCOUNTER — TELEPHONE (OUTPATIENT)
Dept: FAMILY MEDICINE CLINIC | Age: 25
End: 2025-08-06

## 2025-08-07 ENCOUNTER — OFFICE VISIT (OUTPATIENT)
Dept: FAMILY MEDICINE CLINIC | Age: 25
End: 2025-08-07
Payer: COMMERCIAL

## 2025-08-07 VITALS
SYSTOLIC BLOOD PRESSURE: 130 MMHG | RESPIRATION RATE: 14 BRPM | DIASTOLIC BLOOD PRESSURE: 82 MMHG | WEIGHT: 272 LBS | BODY MASS INDEX: 43.71 KG/M2 | OXYGEN SATURATION: 97 % | HEIGHT: 66 IN | HEART RATE: 89 BPM

## 2025-08-07 DIAGNOSIS — F41.9 ANXIETY: Primary | ICD-10-CM

## 2025-08-07 PROCEDURE — 99214 OFFICE O/P EST MOD 30 MIN: CPT | Performed by: NURSE PRACTITIONER

## 2025-08-07 ASSESSMENT — ENCOUNTER SYMPTOMS
COUGH: 0
CHEST TIGHTNESS: 0
GASTROINTESTINAL NEGATIVE: 1
WHEEZING: 0
SHORTNESS OF BREATH: 0

## 2025-08-07 ASSESSMENT — PATIENT HEALTH QUESTIONNAIRE - PHQ9
2. FEELING DOWN, DEPRESSED OR HOPELESS: NOT AT ALL
SUM OF ALL RESPONSES TO PHQ QUESTIONS 1-9: 0
1. LITTLE INTEREST OR PLEASURE IN DOING THINGS: NOT AT ALL
SUM OF ALL RESPONSES TO PHQ QUESTIONS 1-9: 0